# Patient Record
Sex: MALE | Race: WHITE | NOT HISPANIC OR LATINO
[De-identification: names, ages, dates, MRNs, and addresses within clinical notes are randomized per-mention and may not be internally consistent; named-entity substitution may affect disease eponyms.]

---

## 2017-01-04 ENCOUNTER — APPOINTMENT (OUTPATIENT)
Dept: NEPHROLOGY | Facility: CLINIC | Age: 67
End: 2017-01-04

## 2017-01-04 VITALS
HEART RATE: 80 BPM | OXYGEN SATURATION: 95 % | HEIGHT: 67 IN | SYSTOLIC BLOOD PRESSURE: 121 MMHG | DIASTOLIC BLOOD PRESSURE: 72 MMHG | BODY MASS INDEX: 49.44 KG/M2 | WEIGHT: 315 LBS

## 2017-01-05 LAB
ALBUMIN SERPL ELPH-MCNC: 3.6 G/DL
ANION GAP SERPL CALC-SCNC: 15 MMOL/L
APPEARANCE: ABNORMAL
BACTERIA: ABNORMAL
BASOPHILS # BLD AUTO: 0.06 K/UL
BASOPHILS NFR BLD AUTO: 0.7 %
BILIRUBIN URINE: NEGATIVE
BLOOD URINE: ABNORMAL
BUN SERPL-MCNC: 52 MG/DL
CALCIUM OXALATE CRYSTALS: NEGATIVE
CALCIUM SERPL-MCNC: 9.7 MG/DL
CHLORIDE SERPL-SCNC: 103 MMOL/L
CO2 SERPL-SCNC: 20 MMOL/L
COLOR: YELLOW
CREAT SERPL-MCNC: 2.49 MG/DL
CREAT SPEC-SCNC: 77 MG/DL
CREAT/PROT UR: 2.5 RATIO
EOSINOPHIL # BLD AUTO: 0.65 K/UL
EOSINOPHIL NFR BLD AUTO: 7.8 %
GLUCOSE QUALITATIVE U: NORMAL MG/DL
GLUCOSE SERPL-MCNC: 105 MG/DL
GRANULAR CASTS: 0 /LPF
HCT VFR BLD CALC: 45.4 %
HGB BLD-MCNC: 14.7 G/DL
HYALINE CASTS: 0 /LPF
IMM GRANULOCYTES NFR BLD AUTO: 0.2 %
KETONES URINE: NEGATIVE
LEUKOCYTE ESTERASE URINE: ABNORMAL
LYMPHOCYTES # BLD AUTO: 1.79 K/UL
LYMPHOCYTES NFR BLD AUTO: 21.4 %
MAN DIFF?: NORMAL
MCHC RBC-ENTMCNC: 30.4 PG
MCHC RBC-ENTMCNC: 32.4 GM/DL
MCV RBC AUTO: 93.8 FL
MICROSCOPIC-UA: NORMAL
MONOCYTES # BLD AUTO: 0.77 K/UL
MONOCYTES NFR BLD AUTO: 9.2 %
NEUTROPHILS # BLD AUTO: 5.08 K/UL
NEUTROPHILS NFR BLD AUTO: 60.7 %
NITRITE URINE: NEGATIVE
PH URINE: 6
PHOSPHATE SERPL-MCNC: 3.7 MG/DL
PLATELET # BLD AUTO: 223 K/UL
POTASSIUM SERPL-SCNC: 5.3 MMOL/L
PROT UR-MCNC: 194 MG/DL
PROTEIN URINE: 100 MG/DL
RBC # BLD: 4.84 M/UL
RBC # FLD: 14.3 %
RED BLOOD CELLS URINE: 2 /HPF
SODIUM SERPL-SCNC: 138 MMOL/L
SPECIFIC GRAVITY URINE: 1.02
SQUAMOUS EPITHELIAL CELLS: 0 /HPF
TRIPLE PHOSPHATE CRYSTALS: NEGATIVE
URIC ACID CRYSTALS: NEGATIVE
UROBILINOGEN URINE: NORMAL MG/DL
WBC # FLD AUTO: 8.37 K/UL
WHITE BLOOD CELLS URINE: 180 /HPF

## 2017-01-06 ENCOUNTER — LABORATORY RESULT (OUTPATIENT)
Age: 67
End: 2017-01-06

## 2017-01-18 ENCOUNTER — LABORATORY RESULT (OUTPATIENT)
Age: 67
End: 2017-01-18

## 2017-01-18 ENCOUNTER — APPOINTMENT (OUTPATIENT)
Dept: INTERNAL MEDICINE | Facility: CLINIC | Age: 67
End: 2017-01-18

## 2017-01-18 VITALS
SYSTOLIC BLOOD PRESSURE: 120 MMHG | DIASTOLIC BLOOD PRESSURE: 76 MMHG | HEIGHT: 67 IN | BODY MASS INDEX: 49.44 KG/M2 | WEIGHT: 315 LBS

## 2017-01-26 LAB
APPEARANCE: CLEAR
BILIRUBIN URINE: NEGATIVE
BLOOD URINE: ABNORMAL
COLOR: YELLOW
GLUCOSE QUALITATIVE U: NORMAL MG/DL
KETONES URINE: NEGATIVE
LEUKOCYTE ESTERASE URINE: ABNORMAL
NITRITE URINE: POSITIVE
PH URINE: 5.5
PROTEIN URINE: 300 MG/DL
SPECIFIC GRAVITY URINE: 1.02
UROBILINOGEN URINE: NORMAL MG/DL

## 2017-02-03 LAB
ALBUMIN SERPL ELPH-MCNC: 3.8 G/DL
ALP BLD-CCNC: 74 U/L
ALT SERPL-CCNC: 33 U/L
ANION GAP SERPL CALC-SCNC: 21 MMOL/L
AST SERPL-CCNC: 27 U/L
BASOPHILS # BLD AUTO: 0.05 K/UL
BASOPHILS NFR BLD AUTO: 0.6 %
BILIRUB SERPL-MCNC: 0.7 MG/DL
BUN SERPL-MCNC: 58 MG/DL
CALCIUM SERPL-MCNC: 10.2 MG/DL
CHLORIDE SERPL-SCNC: 107 MMOL/L
CHOLEST SERPL-MCNC: 127 MG/DL
CHOLEST/HDLC SERPL: 3.3 RATIO
CO2 SERPL-SCNC: 14 MMOL/L
CREAT SERPL-MCNC: 2.68 MG/DL
EOSINOPHIL # BLD AUTO: 0.67 K/UL
EOSINOPHIL NFR BLD AUTO: 7.5 %
GLUCOSE SERPL-MCNC: 91 MG/DL
HBA1C MFR BLD HPLC: 6.7 %
HCT VFR BLD CALC: 45.2 %
HDLC SERPL-MCNC: 38 MG/DL
HGB BLD-MCNC: 15.3 G/DL
IMM GRANULOCYTES NFR BLD AUTO: 0.1 %
LDLC SERPL CALC-MCNC: 58 MG/DL
LYMPHOCYTES # BLD AUTO: 1.81 K/UL
LYMPHOCYTES NFR BLD AUTO: 20.4 %
MAN DIFF?: NORMAL
MCHC RBC-ENTMCNC: 31.4 PG
MCHC RBC-ENTMCNC: 33.8 GM/DL
MCV RBC AUTO: 92.6 FL
MONOCYTES # BLD AUTO: 0.69 K/UL
MONOCYTES NFR BLD AUTO: 7.8 %
NEUTROPHILS # BLD AUTO: 5.66 K/UL
NEUTROPHILS NFR BLD AUTO: 63.6 %
PLATELET # BLD AUTO: 189 K/UL
POTASSIUM SERPL-SCNC: 5.2 MMOL/L
PROT SERPL-MCNC: 7.3 G/DL
RBC # BLD: 4.88 M/UL
RBC # FLD: 14.3 %
SODIUM SERPL-SCNC: 142 MMOL/L
TRIGL SERPL-MCNC: 154 MG/DL
TSH SERPL-ACNC: 2.05 UIU/ML
WBC # FLD AUTO: 8.89 K/UL

## 2017-02-10 ENCOUNTER — RX RENEWAL (OUTPATIENT)
Age: 67
End: 2017-02-10

## 2017-02-13 ENCOUNTER — RX RENEWAL (OUTPATIENT)
Age: 67
End: 2017-02-13

## 2017-02-22 ENCOUNTER — RX RENEWAL (OUTPATIENT)
Age: 67
End: 2017-02-22

## 2017-03-21 ENCOUNTER — MEDICATION RENEWAL (OUTPATIENT)
Age: 67
End: 2017-03-21

## 2017-03-31 ENCOUNTER — RX RENEWAL (OUTPATIENT)
Age: 67
End: 2017-03-31

## 2017-04-05 ENCOUNTER — APPOINTMENT (OUTPATIENT)
Dept: NEPHROLOGY | Facility: CLINIC | Age: 67
End: 2017-04-05

## 2017-04-05 VITALS
WEIGHT: 301 LBS | DIASTOLIC BLOOD PRESSURE: 72 MMHG | HEIGHT: 67 IN | BODY MASS INDEX: 47.24 KG/M2 | OXYGEN SATURATION: 95 % | HEART RATE: 74 BPM | SYSTOLIC BLOOD PRESSURE: 110 MMHG

## 2017-04-05 RX ORDER — ATENOLOL 25 MG/1
25 TABLET ORAL DAILY
Qty: 90 | Refills: 3 | Status: DISCONTINUED | COMMUNITY
Start: 2017-02-13 | End: 2017-04-05

## 2017-04-05 RX ORDER — LOSARTAN POTASSIUM 25 MG/1
25 TABLET, FILM COATED ORAL
Qty: 30 | Refills: 0 | Status: DISCONTINUED | COMMUNITY
Start: 2016-07-08

## 2017-04-05 RX ORDER — SULFAMETHOXAZOLE AND TRIMETHOPRIM 400; 80 MG/1; MG/1
400-80 TABLET ORAL TWICE DAILY
Qty: 10 | Refills: 0 | Status: DISCONTINUED | COMMUNITY
Start: 2017-01-26 | End: 2017-04-05

## 2017-04-06 LAB
25(OH)D3 SERPL-MCNC: 30.5 NG/ML
ALBUMIN SERPL ELPH-MCNC: 3.7 G/DL
ANION GAP SERPL CALC-SCNC: 13 MMOL/L
BASOPHILS # BLD AUTO: 0.05 K/UL
BASOPHILS NFR BLD AUTO: 0.6 %
BUN SERPL-MCNC: 50 MG/DL
CALCIUM SERPL-MCNC: 9.7 MG/DL
CALCIUM SERPL-MCNC: 9.7 MG/DL
CHLORIDE SERPL-SCNC: 104 MMOL/L
CO2 SERPL-SCNC: 21 MMOL/L
CREAT SERPL-MCNC: 2.59 MG/DL
CREAT SPEC-SCNC: 76 MG/DL
CREAT/PROT UR: 1.8 RATIO
EOSINOPHIL # BLD AUTO: 0.56 K/UL
EOSINOPHIL NFR BLD AUTO: 6.2 %
GLUCOSE SERPL-MCNC: 96 MG/DL
HBA1C MFR BLD HPLC: 5.6 %
HCT VFR BLD CALC: 42.1 %
HGB BLD-MCNC: 13.9 G/DL
IMM GRANULOCYTES NFR BLD AUTO: 0.3 %
LYMPHOCYTES # BLD AUTO: 1.39 K/UL
LYMPHOCYTES NFR BLD AUTO: 15.3 %
MAN DIFF?: NORMAL
MCHC RBC-ENTMCNC: 30.9 PG
MCHC RBC-ENTMCNC: 33 GM/DL
MCV RBC AUTO: 93.6 FL
MONOCYTES # BLD AUTO: 0.79 K/UL
MONOCYTES NFR BLD AUTO: 8.7 %
NEUTROPHILS # BLD AUTO: 6.24 K/UL
NEUTROPHILS NFR BLD AUTO: 68.9 %
PARATHYROID HORMONE INTACT: 26 PG/ML
PHOSPHATE SERPL-MCNC: 2.9 MG/DL
PLATELET # BLD AUTO: 213 K/UL
POTASSIUM SERPL-SCNC: 5 MMOL/L
PROT UR-MCNC: 134 MG/DL
RBC # BLD: 4.5 M/UL
RBC # FLD: 14.5 %
SODIUM SERPL-SCNC: 138 MMOL/L
WBC # FLD AUTO: 9.06 K/UL

## 2017-04-12 ENCOUNTER — APPOINTMENT (OUTPATIENT)
Dept: INTERNAL MEDICINE | Facility: CLINIC | Age: 67
End: 2017-04-12

## 2017-04-12 VITALS
OXYGEN SATURATION: 94 % | DIASTOLIC BLOOD PRESSURE: 60 MMHG | HEART RATE: 78 BPM | WEIGHT: 300 LBS | BODY MASS INDEX: 46.99 KG/M2 | SYSTOLIC BLOOD PRESSURE: 112 MMHG

## 2017-06-06 ENCOUNTER — MEDICATION RENEWAL (OUTPATIENT)
Age: 67
End: 2017-06-06

## 2017-07-05 ENCOUNTER — APPOINTMENT (OUTPATIENT)
Dept: NEPHROLOGY | Facility: CLINIC | Age: 67
End: 2017-07-05

## 2017-07-05 VITALS
OXYGEN SATURATION: 96 % | HEIGHT: 67 IN | HEART RATE: 81 BPM | WEIGHT: 292 LBS | SYSTOLIC BLOOD PRESSURE: 110 MMHG | DIASTOLIC BLOOD PRESSURE: 69 MMHG | BODY MASS INDEX: 45.83 KG/M2

## 2017-07-06 LAB
ALBUMIN SERPL ELPH-MCNC: 3.9 G/DL
ANION GAP SERPL CALC-SCNC: 16 MMOL/L
BASOPHILS # BLD AUTO: 0.03 K/UL
BASOPHILS NFR BLD AUTO: 0.3 %
BUN SERPL-MCNC: 48 MG/DL
CALCIUM SERPL-MCNC: 9.1 MG/DL
CHLORIDE SERPL-SCNC: 102 MMOL/L
CO2 SERPL-SCNC: 19 MMOL/L
CREAT SERPL-MCNC: 2.71 MG/DL
CREAT SPEC-SCNC: 93 MG/DL
CREAT/PROT UR: 1.7 RATIO
EOSINOPHIL # BLD AUTO: 0.73 K/UL
EOSINOPHIL NFR BLD AUTO: 8.2 %
GLUCOSE SERPL-MCNC: 112 MG/DL
HCT VFR BLD CALC: 42.7 %
HGB BLD-MCNC: 14.1 G/DL
IMM GRANULOCYTES NFR BLD AUTO: 0.2 %
LYMPHOCYTES # BLD AUTO: 1.53 K/UL
LYMPHOCYTES NFR BLD AUTO: 17.2 %
MAGNESIUM SERPL-MCNC: 1.6 MG/DL
MAN DIFF?: NORMAL
MCHC RBC-ENTMCNC: 30.9 PG
MCHC RBC-ENTMCNC: 33 GM/DL
MCV RBC AUTO: 93.6 FL
MONOCYTES # BLD AUTO: 1.04 K/UL
MONOCYTES NFR BLD AUTO: 11.7 %
NEUTROPHILS # BLD AUTO: 5.57 K/UL
NEUTROPHILS NFR BLD AUTO: 62.4 %
PHOSPHATE SERPL-MCNC: 3.7 MG/DL
PLATELET # BLD AUTO: 224 K/UL
POTASSIUM SERPL-SCNC: 4.7 MMOL/L
PROT UR-MCNC: 154 MG/DL
RBC # BLD: 4.56 M/UL
RBC # FLD: 14 %
SODIUM SERPL-SCNC: 137 MMOL/L
WBC # FLD AUTO: 8.92 K/UL

## 2017-07-07 LAB — HEMOCCULT STL QL IA: NEGATIVE

## 2017-07-12 ENCOUNTER — APPOINTMENT (OUTPATIENT)
Dept: INTERNAL MEDICINE | Facility: CLINIC | Age: 67
End: 2017-07-12

## 2017-07-12 VITALS
WEIGHT: 298 LBS | OXYGEN SATURATION: 95 % | DIASTOLIC BLOOD PRESSURE: 72 MMHG | SYSTOLIC BLOOD PRESSURE: 119 MMHG | HEART RATE: 85 BPM | BODY MASS INDEX: 46.67 KG/M2

## 2017-08-02 ENCOUNTER — RX RENEWAL (OUTPATIENT)
Age: 67
End: 2017-08-02

## 2017-08-04 ENCOUNTER — MEDICATION RENEWAL (OUTPATIENT)
Age: 67
End: 2017-08-04

## 2017-09-06 ENCOUNTER — APPOINTMENT (OUTPATIENT)
Dept: NEPHROLOGY | Facility: CLINIC | Age: 67
End: 2017-09-06
Payer: MEDICARE

## 2017-09-06 VITALS
BODY MASS INDEX: 46.77 KG/M2 | SYSTOLIC BLOOD PRESSURE: 126 MMHG | OXYGEN SATURATION: 95 % | HEART RATE: 77 BPM | WEIGHT: 298 LBS | DIASTOLIC BLOOD PRESSURE: 77 MMHG | HEIGHT: 67 IN

## 2017-09-06 PROCEDURE — 99214 OFFICE O/P EST MOD 30 MIN: CPT | Mod: 25,GC

## 2017-09-06 PROCEDURE — 90740 HEPB VACC 3 DOSE IMMUNSUP IM: CPT | Mod: GC

## 2017-09-06 PROCEDURE — G0010: CPT | Mod: GC

## 2017-09-07 LAB
ALBUMIN SERPL ELPH-MCNC: 4.1 G/DL
ANION GAP SERPL CALC-SCNC: 16 MMOL/L
BASOPHILS # BLD AUTO: 0.07 K/UL
BASOPHILS NFR BLD AUTO: 0.7 %
BUN SERPL-MCNC: 64 MG/DL
CALCIUM SERPL-MCNC: 9.7 MG/DL
CHLORIDE SERPL-SCNC: 106 MMOL/L
CO2 SERPL-SCNC: 20 MMOL/L
CREAT SERPL-MCNC: 2.65 MG/DL
CREAT SPEC-SCNC: 75 MG/DL
CREAT/PROT UR: 2.6 RATIO
EOSINOPHIL # BLD AUTO: 0.79 K/UL
EOSINOPHIL NFR BLD AUTO: 8.4 %
GLUCOSE SERPL-MCNC: 99 MG/DL
HCT VFR BLD CALC: 47.3 %
HGB BLD-MCNC: 15.2 G/DL
IMM GRANULOCYTES NFR BLD AUTO: 0.3 %
LYMPHOCYTES # BLD AUTO: 2.48 K/UL
LYMPHOCYTES NFR BLD AUTO: 26.4 %
MAN DIFF?: NORMAL
MCHC RBC-ENTMCNC: 30.5 PG
MCHC RBC-ENTMCNC: 32.1 GM/DL
MCV RBC AUTO: 94.8 FL
MONOCYTES # BLD AUTO: 0.87 K/UL
MONOCYTES NFR BLD AUTO: 9.3 %
NEUTROPHILS # BLD AUTO: 5.14 K/UL
NEUTROPHILS NFR BLD AUTO: 54.9 %
PHOSPHATE SERPL-MCNC: 3.7 MG/DL
PLATELET # BLD AUTO: 228 K/UL
POTASSIUM SERPL-SCNC: 5.5 MMOL/L
PROT UR-MCNC: 193 MG/DL
RBC # BLD: 4.99 M/UL
RBC # FLD: 14.9 %
SODIUM SERPL-SCNC: 142 MMOL/L
WBC # FLD AUTO: 9.38 K/UL

## 2017-09-25 ENCOUNTER — RX RENEWAL (OUTPATIENT)
Age: 67
End: 2017-09-25

## 2017-10-10 ENCOUNTER — APPOINTMENT (OUTPATIENT)
Dept: INTERNAL MEDICINE | Facility: CLINIC | Age: 67
End: 2017-10-10
Payer: MEDICARE

## 2017-10-10 VITALS
OXYGEN SATURATION: 96 % | WEIGHT: 312 LBS | HEART RATE: 65 BPM | SYSTOLIC BLOOD PRESSURE: 127 MMHG | BODY MASS INDEX: 48.87 KG/M2 | DIASTOLIC BLOOD PRESSURE: 79 MMHG

## 2017-10-10 PROCEDURE — 99214 OFFICE O/P EST MOD 30 MIN: CPT

## 2017-11-07 ENCOUNTER — RX RENEWAL (OUTPATIENT)
Age: 67
End: 2017-11-07

## 2017-11-20 ENCOUNTER — RX RENEWAL (OUTPATIENT)
Age: 67
End: 2017-11-20

## 2017-12-08 ENCOUNTER — MEDICATION RENEWAL (OUTPATIENT)
Age: 67
End: 2017-12-08

## 2017-12-11 ENCOUNTER — OUTPATIENT (OUTPATIENT)
Dept: OUTPATIENT SERVICES | Facility: HOSPITAL | Age: 67
LOS: 1 days | End: 2017-12-11
Payer: MEDICARE

## 2017-12-11 ENCOUNTER — APPOINTMENT (OUTPATIENT)
Dept: UROLOGY | Facility: CLINIC | Age: 67
End: 2017-12-11
Payer: MEDICARE

## 2017-12-11 DIAGNOSIS — R35.0 FREQUENCY OF MICTURITION: ICD-10-CM

## 2017-12-11 PROCEDURE — 76775 US EXAM ABDO BACK WALL LIM: CPT

## 2017-12-11 PROCEDURE — 76775 US EXAM ABDO BACK WALL LIM: CPT | Mod: 26

## 2017-12-11 PROCEDURE — 99214 OFFICE O/P EST MOD 30 MIN: CPT | Mod: 25

## 2017-12-26 ENCOUNTER — RX RENEWAL (OUTPATIENT)
Age: 67
End: 2017-12-26

## 2017-12-26 DIAGNOSIS — N20.0 CALCULUS OF KIDNEY: ICD-10-CM

## 2017-12-26 DIAGNOSIS — N18.4 CHRONIC KIDNEY DISEASE, STAGE 4 (SEVERE): ICD-10-CM

## 2018-01-02 ENCOUNTER — RX RENEWAL (OUTPATIENT)
Age: 68
End: 2018-01-02

## 2018-01-05 ENCOUNTER — MEDICATION RENEWAL (OUTPATIENT)
Age: 68
End: 2018-01-05

## 2018-01-08 ENCOUNTER — RX RENEWAL (OUTPATIENT)
Age: 68
End: 2018-01-08

## 2018-01-10 ENCOUNTER — APPOINTMENT (OUTPATIENT)
Dept: INTERNAL MEDICINE | Facility: CLINIC | Age: 68
End: 2018-01-10
Payer: MEDICARE

## 2018-01-10 VITALS
WEIGHT: 312 LBS | HEART RATE: 80 BPM | OXYGEN SATURATION: 96 % | DIASTOLIC BLOOD PRESSURE: 80 MMHG | BODY MASS INDEX: 48.97 KG/M2 | SYSTOLIC BLOOD PRESSURE: 130 MMHG | HEIGHT: 67 IN

## 2018-01-10 PROCEDURE — G0008: CPT

## 2018-01-10 PROCEDURE — 99214 OFFICE O/P EST MOD 30 MIN: CPT | Mod: 25

## 2018-01-10 PROCEDURE — 90688 IIV4 VACCINE SPLT 0.5 ML IM: CPT

## 2018-01-17 ENCOUNTER — APPOINTMENT (OUTPATIENT)
Dept: NEPHROLOGY | Facility: CLINIC | Age: 68
End: 2018-01-17
Payer: MEDICARE

## 2018-01-17 VITALS
OXYGEN SATURATION: 96 % | HEIGHT: 67 IN | HEART RATE: 83 BPM | DIASTOLIC BLOOD PRESSURE: 64 MMHG | SYSTOLIC BLOOD PRESSURE: 116 MMHG

## 2018-01-17 VITALS — SYSTOLIC BLOOD PRESSURE: 110 MMHG | DIASTOLIC BLOOD PRESSURE: 70 MMHG

## 2018-01-17 VITALS — SYSTOLIC BLOOD PRESSURE: 92 MMHG | DIASTOLIC BLOOD PRESSURE: 60 MMHG

## 2018-01-17 PROCEDURE — 99214 OFFICE O/P EST MOD 30 MIN: CPT | Mod: GC

## 2018-01-22 LAB
ALBUMIN SERPL ELPH-MCNC: 4 G/DL
ANION GAP SERPL CALC-SCNC: 17 MMOL/L
BASOPHILS # BLD AUTO: 0.08 K/UL
BASOPHILS NFR BLD AUTO: 0.8 %
BUN SERPL-MCNC: 59 MG/DL
CALCIUM SERPL-MCNC: 9.3 MG/DL
CHLORIDE SERPL-SCNC: 106 MMOL/L
CO2 SERPL-SCNC: 17 MMOL/L
CREAT SERPL-MCNC: 2.81 MG/DL
CREAT SPEC-SCNC: 97 MG/DL
CREAT/PROT UR: 2.3 RATIO
EOSINOPHIL # BLD AUTO: 0.76 K/UL
EOSINOPHIL NFR BLD AUTO: 7.7 %
GLUCOSE SERPL-MCNC: 138 MG/DL
HBA1C MFR BLD HPLC: 6.1 %
HCT VFR BLD CALC: 46.9 %
HGB BLD-MCNC: 15.2 G/DL
IMM GRANULOCYTES NFR BLD AUTO: 0.1 %
LYMPHOCYTES # BLD AUTO: 2.36 K/UL
LYMPHOCYTES NFR BLD AUTO: 24.1 %
MAN DIFF?: NORMAL
MCHC RBC-ENTMCNC: 30.5 PG
MCHC RBC-ENTMCNC: 32.4 GM/DL
MCV RBC AUTO: 94.2 FL
MONOCYTES # BLD AUTO: 1 K/UL
MONOCYTES NFR BLD AUTO: 10.2 %
NEUTROPHILS # BLD AUTO: 5.6 K/UL
NEUTROPHILS NFR BLD AUTO: 57.1 %
PHOSPHATE SERPL-MCNC: 3.7 MG/DL
PLATELET # BLD AUTO: 221 K/UL
POTASSIUM SERPL-SCNC: 5 MMOL/L
PROT UR-MCNC: 224 MG/DL
RBC # BLD: 4.98 M/UL
RBC # FLD: 15.2 %
SODIUM SERPL-SCNC: 140 MMOL/L
URATE SERPL-MCNC: 6.9 MG/DL
WBC # FLD AUTO: 9.81 K/UL

## 2018-02-05 LAB
ALBUMIN SERPL ELPH-MCNC: 3.7 G/DL
ANION GAP SERPL CALC-SCNC: 15 MMOL/L
BUN SERPL-MCNC: 61 MG/DL
CALCIUM SERPL-MCNC: 9 MG/DL
CHLORIDE SERPL-SCNC: 105 MMOL/L
CO2 SERPL-SCNC: 19 MMOL/L
CREAT SERPL-MCNC: 3.06 MG/DL
GLUCOSE SERPL-MCNC: 126 MG/DL
PHOSPHATE SERPL-MCNC: 4.5 MG/DL
POTASSIUM SERPL-SCNC: 5.1 MMOL/L
SODIUM SERPL-SCNC: 139 MMOL/L

## 2018-03-23 ENCOUNTER — MEDICATION RENEWAL (OUTPATIENT)
Age: 68
End: 2018-03-23

## 2018-05-09 ENCOUNTER — APPOINTMENT (OUTPATIENT)
Dept: UROLOGY | Facility: CLINIC | Age: 68
End: 2018-05-09
Payer: MEDICARE

## 2018-05-09 VITALS
TEMPERATURE: 97.9 F | SYSTOLIC BLOOD PRESSURE: 102 MMHG | HEART RATE: 76 BPM | RESPIRATION RATE: 17 BRPM | BODY MASS INDEX: 48.97 KG/M2 | HEIGHT: 67 IN | WEIGHT: 312 LBS | DIASTOLIC BLOOD PRESSURE: 66 MMHG

## 2018-05-09 PROCEDURE — 99213 OFFICE O/P EST LOW 20 MIN: CPT

## 2018-05-23 ENCOUNTER — APPOINTMENT (OUTPATIENT)
Dept: NEPHROLOGY | Facility: CLINIC | Age: 68
End: 2018-05-23
Payer: MEDICARE

## 2018-05-23 VITALS
OXYGEN SATURATION: 96 % | DIASTOLIC BLOOD PRESSURE: 79 MMHG | RESPIRATION RATE: 16 BRPM | BODY MASS INDEX: 48.4 KG/M2 | SYSTOLIC BLOOD PRESSURE: 131 MMHG | WEIGHT: 309 LBS | HEART RATE: 75 BPM

## 2018-05-23 PROCEDURE — 99214 OFFICE O/P EST MOD 30 MIN: CPT

## 2018-05-24 DIAGNOSIS — E55.9 VITAMIN D DEFICIENCY, UNSPECIFIED: ICD-10-CM

## 2018-05-24 LAB
25(OH)D3 SERPL-MCNC: 32 NG/ML
ALBUMIN SERPL ELPH-MCNC: 3.9 G/DL
ANION GAP SERPL CALC-SCNC: 22 MMOL/L
BUN SERPL-MCNC: 56 MG/DL
CALCIUM SERPL-MCNC: 9.2 MG/DL
CALCIUM SERPL-MCNC: 9.2 MG/DL
CHLORIDE SERPL-SCNC: 108 MMOL/L
CO2 SERPL-SCNC: 13 MMOL/L
CREAT SERPL-MCNC: 2.61 MG/DL
CREAT SPEC-SCNC: 81 MG/DL
CREAT/PROT UR: 2.3 RATIO
FERRITIN SERPL-MCNC: 209 NG/ML
GLUCOSE SERPL-MCNC: 121 MG/DL
HBV SURFACE AB SER QL: NONREACTIVE
HBV SURFACE AG SER QL: NONREACTIVE
HCV AB SER QL: NONREACTIVE
HCV S/CO RATIO: 0.13 S/CO
IRON SATN MFR SERPL: 26 %
IRON SERPL-MCNC: 63 UG/DL
PARATHYROID HORMONE INTACT: 75 PG/ML
PHOSPHATE SERPL-MCNC: 3.4 MG/DL
POTASSIUM SERPL-SCNC: 5.8 MMOL/L
PROT UR-MCNC: 185 MG/DL
SODIUM SERPL-SCNC: 143 MMOL/L
TIBC SERPL-MCNC: 240 UG/DL
UIBC SERPL-MCNC: 177 UG/DL

## 2018-05-29 ENCOUNTER — APPOINTMENT (OUTPATIENT)
Dept: INTERNAL MEDICINE | Facility: CLINIC | Age: 68
End: 2018-05-29
Payer: MEDICARE

## 2018-05-29 VITALS
OXYGEN SATURATION: 96 % | WEIGHT: 307 LBS | SYSTOLIC BLOOD PRESSURE: 124 MMHG | DIASTOLIC BLOOD PRESSURE: 72 MMHG | HEART RATE: 83 BPM | HEIGHT: 67 IN | BODY MASS INDEX: 48.18 KG/M2

## 2018-05-29 PROCEDURE — 99214 OFFICE O/P EST MOD 30 MIN: CPT

## 2018-05-30 NOTE — PLAN
[FreeTextEntry1] : Pt to restart logging of glucoses; has new interest in working on dm control with diet again.  exercise must come back into picture as well to the degree it is possible for him.  can do walking, exercise bike.  must lose weight so that thr becomes viable for him; then greater walking/exercise program/wt loss is within reach.  he is following up regularly with Dr. Velasco.  He plans to recheck bloods next week or week after, as just yesterday started kayexelate in place of veltassa.

## 2018-05-30 NOTE — PHYSICAL EXAM

## 2018-05-30 NOTE — HISTORY OF PRESENT ILLNESS
[FreeTextEntry1] : follow-up [de-identified] : Pt just started new medication for hyperkalemia secondary to ckd yesterday - is not ready to check bloods again today.  but he has picked it up and gotten it initiated at least.  cost had been an issue for veltassa.  he continues to feel paresthesias at le's which are uncomfortable.  gabapentin does help - higher dosing may be an issue because of renal fxn.  he has become motivated again to lose weight and has curtailed po intake overall quite significantly over past couple of weeks.  he is losing weight again.  goal is to be eligible for hip surgery as his hip is constantly painful.  he has not restarted any semblance of exercise however - though has exercise bike at home.

## 2018-08-10 ENCOUNTER — MEDICATION RENEWAL (OUTPATIENT)
Age: 68
End: 2018-08-10

## 2018-08-29 ENCOUNTER — APPOINTMENT (OUTPATIENT)
Dept: NEPHROLOGY | Facility: CLINIC | Age: 68
End: 2018-08-29
Payer: MEDICARE

## 2018-08-29 VITALS
WEIGHT: 302 LBS | HEIGHT: 67 IN | SYSTOLIC BLOOD PRESSURE: 130 MMHG | DIASTOLIC BLOOD PRESSURE: 85 MMHG | OXYGEN SATURATION: 96 % | HEART RATE: 85 BPM | BODY MASS INDEX: 47.4 KG/M2

## 2018-08-29 DIAGNOSIS — R80.9 PROTEINURIA, UNSPECIFIED: ICD-10-CM

## 2018-08-29 DIAGNOSIS — N20.0 CALCULUS OF KIDNEY: ICD-10-CM

## 2018-08-29 PROCEDURE — 90740 HEPB VACC 3 DOSE IMMUNSUP IM: CPT | Mod: GC

## 2018-08-29 PROCEDURE — 99214 OFFICE O/P EST MOD 30 MIN: CPT | Mod: 25,GC

## 2018-08-29 PROCEDURE — G0010: CPT | Mod: GC

## 2018-08-31 LAB
25(OH)D3 SERPL-MCNC: 72.9 NG/ML
ALBUMIN SERPL ELPH-MCNC: 3.9 G/DL
ANION GAP SERPL CALC-SCNC: 17 MMOL/L
APPEARANCE: CLEAR
BACTERIA: NEGATIVE
BILIRUBIN URINE: NEGATIVE
BLOOD URINE: ABNORMAL
BUN SERPL-MCNC: 47 MG/DL
CALCIUM SERPL-MCNC: 9.8 MG/DL
CALCIUM SERPL-MCNC: 9.8 MG/DL
CHLORIDE SERPL-SCNC: 105 MMOL/L
CO2 SERPL-SCNC: 21 MMOL/L
COLOR: YELLOW
CREAT SERPL-MCNC: 2.23 MG/DL
CREAT SPEC-SCNC: 76 MG/DL
CREAT/PROT UR: 4.2 RATIO
GLUCOSE QUALITATIVE U: NEGATIVE MG/DL
GLUCOSE SERPL-MCNC: 173 MG/DL
HYALINE CASTS: 1 /LPF
KETONES URINE: NEGATIVE
LEUKOCYTE ESTERASE URINE: ABNORMAL
MICROSCOPIC-UA: NORMAL
NITRITE URINE: NEGATIVE
PARATHYROID HORMONE INTACT: 72 PG/ML
PH URINE: 6
PHOSPHATE SERPL-MCNC: 4 MG/DL
POTASSIUM SERPL-SCNC: 5.3 MMOL/L
PROT UR-MCNC: 322 MG/DL
PROTEIN URINE: 300 MG/DL
RED BLOOD CELLS URINE: 4 /HPF
SODIUM SERPL-SCNC: 143 MMOL/L
SPECIFIC GRAVITY URINE: 1.02
SQUAMOUS EPITHELIAL CELLS: 0 /HPF
UROBILINOGEN URINE: NEGATIVE MG/DL
WHITE BLOOD CELLS URINE: 58 /HPF

## 2018-09-16 ENCOUNTER — TRANSCRIPTION ENCOUNTER (OUTPATIENT)
Age: 68
End: 2018-09-16

## 2018-10-03 ENCOUNTER — APPOINTMENT (OUTPATIENT)
Dept: NEPHROLOGY | Facility: CLINIC | Age: 68
End: 2018-10-03

## 2018-10-03 ENCOUNTER — APPOINTMENT (OUTPATIENT)
Dept: INTERNAL MEDICINE | Facility: CLINIC | Age: 68
End: 2018-10-03
Payer: MEDICARE

## 2018-10-03 VITALS
BODY MASS INDEX: 47.09 KG/M2 | HEART RATE: 88 BPM | OXYGEN SATURATION: 95 % | SYSTOLIC BLOOD PRESSURE: 140 MMHG | WEIGHT: 300 LBS | DIASTOLIC BLOOD PRESSURE: 80 MMHG | HEIGHT: 67 IN

## 2018-10-03 VITALS
SYSTOLIC BLOOD PRESSURE: 142 MMHG | OXYGEN SATURATION: 96 % | HEART RATE: 82 BPM | DIASTOLIC BLOOD PRESSURE: 78 MMHG | HEIGHT: 67 IN

## 2018-10-03 PROCEDURE — 90662 IIV NO PRSV INCREASED AG IM: CPT

## 2018-10-03 PROCEDURE — 99214 OFFICE O/P EST MOD 30 MIN: CPT | Mod: 25

## 2018-10-03 PROCEDURE — G0008: CPT

## 2018-10-13 NOTE — PLAN
[FreeTextEntry1] : Pt is doing well with diet such that has gotten weight to verge of breaking 300#, which has been a real barrier for him.  FS have come into line very well with this approach - diet has been generally quite low carb.  I have asked himi to make appt with orthopedics to discuss options regarding hip which has been constantly painful and limiting.  he wants however to lose more weight first before consulting with ortho.  he has required t+c in order to remain somewhat comfortable and walking as needs to, has tolerated without side effects.  will continue for now - definitive tx of hip remains the goal.  following up regularly and well with Dr. Velasco for ckd.  hep b#2 today.

## 2018-10-13 NOTE — HISTORY OF PRESENT ILLNESS
[FreeTextEntry1] : follow-up [de-identified] : Pt with recent ear infection - went to urgent care.  had used drops in r ear, then in l ear when that ear began to be uncomfortable.  has stopped being as aggressive with q-tips since that time.  Meanwhile, pt has been keeping up dietary efforts and has brought weight to point he is getting under 300#.  His aspiration is to keep driving weight below 300# and then see orthopedics to discuss possible operation for hip.  he is making his meals more mixed - protein/fat/limited carbs.  FS in AM  have been 80-90.  is one shot into hep B series - was found to be non-immune by nephrology - and would be due for vax #2

## 2018-10-13 NOTE — PHYSICAL EXAM

## 2018-10-23 ENCOUNTER — RX RENEWAL (OUTPATIENT)
Age: 68
End: 2018-10-23

## 2018-11-09 ENCOUNTER — RX RENEWAL (OUTPATIENT)
Age: 68
End: 2018-11-09

## 2018-11-14 ENCOUNTER — RX RENEWAL (OUTPATIENT)
Age: 68
End: 2018-11-14

## 2018-11-15 ENCOUNTER — RX CHANGE (OUTPATIENT)
Age: 68
End: 2018-11-15

## 2018-11-19 RX ORDER — SODIUM POLYSTYRENE SULFONATE 15 G/60ML
15 SUSPENSION ORAL; RECTAL DAILY
Qty: 900 | Refills: 11 | Status: DISCONTINUED | COMMUNITY
Start: 2018-05-22 | End: 2018-11-19

## 2018-11-19 RX ORDER — SODIUM POLYSTYRENE SULFONATE 4.1 MEQ/G
POWDER, FOR SUSPENSION ORAL; RECTAL
Qty: 30 | Refills: 2 | Status: DISCONTINUED | COMMUNITY
Start: 2018-11-15 | End: 2018-11-19

## 2018-11-29 ENCOUNTER — RX RENEWAL (OUTPATIENT)
Age: 68
End: 2018-11-29

## 2018-12-06 ENCOUNTER — MEDICATION RENEWAL (OUTPATIENT)
Age: 68
End: 2018-12-06

## 2018-12-16 ENCOUNTER — RX RENEWAL (OUTPATIENT)
Age: 68
End: 2018-12-16

## 2019-01-07 ENCOUNTER — MEDICATION RENEWAL (OUTPATIENT)
Age: 69
End: 2019-01-07

## 2019-01-09 ENCOUNTER — APPOINTMENT (OUTPATIENT)
Dept: INTERNAL MEDICINE | Facility: CLINIC | Age: 69
End: 2019-01-09
Payer: MEDICARE

## 2019-01-09 VITALS
OXYGEN SATURATION: 94 % | SYSTOLIC BLOOD PRESSURE: 150 MMHG | HEIGHT: 67 IN | WEIGHT: 308 LBS | BODY MASS INDEX: 48.34 KG/M2 | HEART RATE: 110 BPM | DIASTOLIC BLOOD PRESSURE: 90 MMHG

## 2019-01-09 PROCEDURE — 99214 OFFICE O/P EST MOD 30 MIN: CPT

## 2019-01-10 ENCOUNTER — RX RENEWAL (OUTPATIENT)
Age: 69
End: 2019-01-10

## 2019-01-22 NOTE — PLAN
[FreeTextEntry1] : pt working again on weight loss.  exercise limited but has been successful in sharply limiting carbohydrate intake to get weight down. working on this approach again fairly assiduously this past week and will need to continue to do so into next mos. will continue to follow up regullarly with Dr. Velasco and prefers to check his blood tests there.  bp more borderline than usual today but will expect this to decrease with weight as well.  will need cardiology evaluation prior to any procedure, and given stable but atypical ekg's in past would like to start evaluation process soon. he has historically strongly resisted this, but seems somewhat open to it today.  have referred pt to Dr. Oneill.  3 month follow-up in office.

## 2019-01-22 NOTE — PHYSICAL EXAM

## 2019-01-22 NOTE — HISTORY OF PRESENT ILLNESS
[FreeTextEntry1] : follow-up [de-identified] : Pt had stretch during holidays in which had fallen off best diet but is now back to dieting successfully.  reports fs have been wnl fasting at any rate - fs .  has been using 'blue emu' at feet and this is helpful to limit his peripheral neuropathy.  has been finding gabapentin effective as well.  we discussed keeping active, optimizing glucoses, and losing weight as means of moving towards viability/greater safety in undergoing hip replacement surgery which is needed to keep his function up.

## 2019-02-04 ENCOUNTER — RX RENEWAL (OUTPATIENT)
Age: 69
End: 2019-02-04

## 2019-02-06 ENCOUNTER — APPOINTMENT (OUTPATIENT)
Dept: NEPHROLOGY | Facility: CLINIC | Age: 69
End: 2019-02-06
Payer: MEDICARE

## 2019-02-06 VITALS
SYSTOLIC BLOOD PRESSURE: 143 MMHG | HEART RATE: 90 BPM | WEIGHT: 310 LBS | BODY MASS INDEX: 48.65 KG/M2 | DIASTOLIC BLOOD PRESSURE: 75 MMHG | HEIGHT: 67 IN | OXYGEN SATURATION: 95 %

## 2019-02-06 PROCEDURE — 99214 OFFICE O/P EST MOD 30 MIN: CPT | Mod: GC

## 2019-02-07 NOTE — REVIEW OF SYSTEMS
[Difficulty Walking] : difficulty walking [Fever] : no fever [Chills] : no chills [Feeling Poorly] : not feeling poorly [Feeling Tired] : not feeling tired [Recent Weight Gain (___ Lbs)] : no recent weight gain [Recent Weight Loss (___ Lbs)] : no recent weight loss [Eyesight Problems] : no eyesight problems [Loss Of Hearing] : no hearing loss [Sore Throat] : no sore throat [Chest Pain] : no chest pain [Palpitations] : no palpitations [Leg Claudication] : no intermittent leg claudication [Lower Ext Edema] : no extremity edema [Shortness Of Breath] : no shortness of breath [Cough] : no cough [SOB on Exertion] : no shortness of breath during exertion [Orthopnea] : no orthopnea [Abdominal Pain] : no abdominal pain [Vomiting] : no vomiting [Constipation] : constipation [Diarrhea] : no diarrhea [Dysuria] : no dysuria [Hesitancy] : no urinary hesitancy [Joint Swelling] : no joint swelling [Limb Swelling] : no limb swelling [Dizziness] : no dizziness [Fainting] : no fainting [Limb Weakness] : no limb weakness [Negative] : Heme/Lymph [FreeTextEntry9] : left Hip pain.  [de-identified] : Due to hip pain.

## 2019-02-07 NOTE — ASSESSMENT
[FreeTextEntry1] : 68 y.o M with PMHx mentioned above, following for CKD stage 4 with proteinuria,  secondary to HTN and DM, stable.\par \par CKD stage 4. \par -Stable, could not obtain labs today as patient is difficult stick, discussed with patient and will come back at later date for lab draw\par -Has proteinuria, on losartan. \par -On Bicarbonate tablets due to metabolic acidosis\par \par Hyperkalemia:\par - On Kayexalate, having some trouble with bowel movements on medication, relieved by prune juice\par - Continue to monitor K, awaiting lab draw\par \par Hypertension:\par -well controlled, continue current regimen. \par \par Mineral Bone disorder. \par -Awaiting patient return for lab draw\par \par Hep B. \par - Received vaccination during last visit \par \par RTC in 3-4 months.

## 2019-02-07 NOTE — PHYSICAL EXAM
[General Appearance - Alert] : alert [General Appearance - In No Acute Distress] : in no acute distress [Sclera] : the sclera and conjunctiva were normal [Oropharynx] : the oropharynx was normal [Jugular Venous Distention Increased] : there was no jugular-venous distention [Thyroid Diffuse Enlargement] : the thyroid was not enlarged [Auscultation Breath Sounds / Voice Sounds] : lungs were clear to auscultation bilaterally [Heart Rate And Rhythm] : heart rate was normal and rhythm regular [Heart Sounds] : normal S1 and S2 [Heart Sounds Gallop] : no gallops [Murmurs] : no murmurs [Heart Sounds Pericardial Friction Rub] : no pericardial rub [Edema] : there was no peripheral edema [Bowel Sounds] : normal bowel sounds [Abdomen Soft] : soft [Abdomen Tenderness] : non-tender [Abdomen Mass (___ Cm)] : no abdominal mass palpated [Abdomen Hernia] : no hernia was discovered [] : no rash [No Focal Deficits] : no focal deficits [Oriented To Time, Place, And Person] : oriented to person, place, and time [FreeTextEntry1] : Varicose veins noted. Left Hip pain.

## 2019-02-12 ENCOUNTER — RX RENEWAL (OUTPATIENT)
Age: 69
End: 2019-02-12

## 2019-02-14 ENCOUNTER — RESULT REVIEW (OUTPATIENT)
Age: 69
End: 2019-02-14

## 2019-02-14 LAB
ALBUMIN SERPL ELPH-MCNC: 3.4 G/DL
ALBUMIN SERPL ELPH-MCNC: 3.4 G/DL
ALP BLD-CCNC: 75 U/L
ALT SERPL-CCNC: 18 U/L
ANION GAP SERPL CALC-SCNC: 14 MMOL/L
AST SERPL-CCNC: 22 U/L
BASOPHILS # BLD AUTO: 0.05 K/UL
BASOPHILS NFR BLD AUTO: 0.8 %
BILIRUB DIRECT SERPL-MCNC: 0.2 MG/DL
BILIRUB INDIRECT SERPL-MCNC: 0.4 MG/DL
BILIRUB SERPL-MCNC: 0.6 MG/DL
BUN SERPL-MCNC: 24 MG/DL
CALCIUM SERPL-MCNC: 9 MG/DL
CHLORIDE SERPL-SCNC: 102 MMOL/L
CO2 SERPL-SCNC: 29 MMOL/L
CREAT SERPL-MCNC: 2.02 MG/DL
CREAT SPEC-SCNC: 97 MG/DL
CREAT/PROT UR: 8.9 RATIO
EOSINOPHIL # BLD AUTO: 0.49 K/UL
EOSINOPHIL NFR BLD AUTO: 8.2 %
GLUCOSE SERPL-MCNC: 137 MG/DL
HBA1C MFR BLD HPLC: 7 %
HCT VFR BLD CALC: 49.4 %
HGB BLD-MCNC: 16 G/DL
IMM GRANULOCYTES NFR BLD AUTO: 0.3 %
LYMPHOCYTES # BLD AUTO: 1.69 K/UL
LYMPHOCYTES NFR BLD AUTO: 28.3 %
MAGNESIUM SERPL-MCNC: 1.5 MG/DL
MAN DIFF?: NORMAL
MCHC RBC-ENTMCNC: 30.3 PG
MCHC RBC-ENTMCNC: 32.4 GM/DL
MCV RBC AUTO: 93.6 FL
MONOCYTES # BLD AUTO: 0.5 K/UL
MONOCYTES NFR BLD AUTO: 8.4 %
NEUTROPHILS # BLD AUTO: 3.22 K/UL
NEUTROPHILS NFR BLD AUTO: 54 %
PHOSPHATE SERPL-MCNC: 2.9 MG/DL
PLATELET # BLD AUTO: 191 K/UL
POTASSIUM SERPL-SCNC: 4 MMOL/L
PROT SERPL-MCNC: 6.4 G/DL
PROT UR-MCNC: 866 MG/DL
RBC # BLD: 5.28 M/UL
RBC # FLD: 14.6 %
SODIUM SERPL-SCNC: 145 MMOL/L
WBC # FLD AUTO: 5.97 K/UL

## 2019-02-19 LAB
PHOSPHOLIPASE A2 RECEPTOR ELISA: 2 RU/ML
PHOSPHOLIPASE A2 RECEPTOR IFA: NEGATIVE

## 2019-03-05 ENCOUNTER — RX RENEWAL (OUTPATIENT)
Age: 69
End: 2019-03-05

## 2019-03-12 ENCOUNTER — RX RENEWAL (OUTPATIENT)
Age: 69
End: 2019-03-12

## 2019-03-13 ENCOUNTER — RX RENEWAL (OUTPATIENT)
Age: 69
End: 2019-03-13

## 2019-03-14 ENCOUNTER — MEDICATION RENEWAL (OUTPATIENT)
Age: 69
End: 2019-03-14

## 2019-03-14 ENCOUNTER — RX RENEWAL (OUTPATIENT)
Age: 69
End: 2019-03-14

## 2019-03-14 RX ORDER — SODIUM POLYSTYRENE SULFONATE 15 G/60ML
15 SUSPENSION ORAL; RECTAL DAILY
Qty: 1800 | Refills: 11 | Status: DISCONTINUED | COMMUNITY
Start: 2019-03-13 | End: 2019-03-14

## 2019-03-15 ENCOUNTER — RX RENEWAL (OUTPATIENT)
Age: 69
End: 2019-03-15

## 2019-04-09 ENCOUNTER — RESULT REVIEW (OUTPATIENT)
Age: 69
End: 2019-04-09

## 2019-04-09 LAB
ALBUMIN SERPL ELPH-MCNC: 3.4 G/DL
ANION GAP SERPL CALC-SCNC: 12 MMOL/L
BUN SERPL-MCNC: 31 MG/DL
CALCIUM SERPL-MCNC: 9 MG/DL
CHLORIDE SERPL-SCNC: 103 MMOL/L
CO2 SERPL-SCNC: 29 MMOL/L
CREAT SERPL-MCNC: 2.44 MG/DL
CREAT SPEC-SCNC: 113 MG/DL
CREAT/PROT UR: 9.6 RATIO
GLUCOSE SERPL-MCNC: 160 MG/DL
MAGNESIUM SERPL-MCNC: 1.5 MG/DL
PHOSPHATE SERPL-MCNC: 3.1 MG/DL
POTASSIUM SERPL-SCNC: 4.1 MMOL/L
PROT UR-MCNC: 1083 MG/DL
SODIUM SERPL-SCNC: 144 MMOL/L

## 2019-04-10 ENCOUNTER — APPOINTMENT (OUTPATIENT)
Dept: INTERNAL MEDICINE | Facility: CLINIC | Age: 69
End: 2019-04-10
Payer: MEDICARE

## 2019-04-10 VITALS
DIASTOLIC BLOOD PRESSURE: 80 MMHG | OXYGEN SATURATION: 95 % | WEIGHT: 313 LBS | HEIGHT: 67 IN | SYSTOLIC BLOOD PRESSURE: 140 MMHG | HEART RATE: 100 BPM | BODY MASS INDEX: 49.12 KG/M2

## 2019-04-10 DIAGNOSIS — R05 COUGH: ICD-10-CM

## 2019-04-10 DIAGNOSIS — R09.81 NASAL CONGESTION: ICD-10-CM

## 2019-04-10 PROCEDURE — 99214 OFFICE O/P EST MOD 30 MIN: CPT

## 2019-04-17 NOTE — HISTORY OF PRESENT ILLNESS
[de-identified] : Pt has had some recent sinus drip with hoarse voice resulting, occasional cough, but seems now to be letting up a bit.  This has been going on a few weeks in total.  He has been going through some dental work, with some extractions.  Had been losing weight but stalled out a bit.  Hip is still very painful and limiting mobility. Needs new glucometer - has not been obtaining reliable nor regular readings from his previous. Understands the gravity of getting these issues right.  Is somewhat willing to see an orthopedist at this point about his hip. [FreeTextEntry1] : follow up

## 2019-04-17 NOTE — PLAN
[FreeTextEntry1] : Pt with recent uri causing some ongoing sinus congestion and postnasal drip but slowly resolving.  Can use Fexofenadine for several days to resolve sx.  Must get back to checking FS regularly.  Must get back to some degree of exercise and to best diet to bring weight better into line.  Have referred to orthopedics for evaluation - it is time for him to address disabling hip pain and likely need for THR.  Will also be evaluated by Cardiology given vascular risks and difficulty ascertaining true cardiovascular functional status amidst disabling hip issue, with an eye towards preoperative assessment.  May utilize t+c prn for now.

## 2019-04-17 NOTE — PHYSICAL EXAM
[No Acute Distress] : no acute distress [Well Nourished] : well nourished [Well Developed] : well developed [Well-Appearing] : well-appearing [Normal Sclera/Conjunctiva] : normal sclera/conjunctiva [PERRL] : pupils equal round and reactive to light [EOMI] : extraocular movements intact [Normal Outer Ear/Nose] : the outer ears and nose were normal in appearance [No JVD] : no jugular venous distention [Supple] : supple [No Lymphadenopathy] : no lymphadenopathy [Thyroid Normal, No Nodules] : the thyroid was normal and there were no nodules present [No Respiratory Distress] : no respiratory distress  [Clear to Auscultation] : lungs were clear to auscultation bilaterally [No Accessory Muscle Use] : no accessory muscle use [Normal Rate] : normal rate  [Regular Rhythm] : with a regular rhythm [Normal S1, S2] : normal S1 and S2 [No Murmur] : no murmur heard [No Varicosities] : no varicosities [No Abdominal Bruit] : a ~M bruit was not heard ~T in the abdomen [No Carotid Bruits] : no carotid bruits [Pedal Pulses Present] : the pedal pulses are present [No Edema] : there was no peripheral edema [No Palpable Aorta] : no palpable aorta [Soft] : abdomen soft [No Extremity Clubbing/Cyanosis] : no extremity clubbing/cyanosis [Non-distended] : non-distended [Non Tender] : non-tender [No Masses] : no abdominal mass palpated [No HSM] : no HSM [Normal Bowel Sounds] : normal bowel sounds [Normal Anterior Cervical Nodes] : no anterior cervical lymphadenopathy [Normal Posterior Cervical Nodes] : no posterior cervical lymphadenopathy [No CVA Tenderness] : no CVA  tenderness [No Joint Swelling] : no joint swelling [No Spinal Tenderness] : no spinal tenderness [No Rash] : no rash [Normal Gait] : normal gait [No Focal Deficits] : no focal deficits [Coordination Grossly Intact] : coordination grossly intact [Normal Affect] : the affect was normal [Deep Tendon Reflexes (DTR)] : deep tendon reflexes were 2+ and symmetric [Normal Insight/Judgement] : insight and judgment were intact [de-identified] : swollen nasal turbinates, some injection of oropharynx [de-identified] : gait impaired by r hip pain; antalgic

## 2019-05-06 ENCOUNTER — APPOINTMENT (OUTPATIENT)
Dept: UROLOGY | Facility: CLINIC | Age: 69
End: 2019-05-06

## 2019-05-08 ENCOUNTER — APPOINTMENT (OUTPATIENT)
Dept: UROLOGY | Facility: CLINIC | Age: 69
End: 2019-05-08
Payer: MEDICARE

## 2019-05-08 ENCOUNTER — OUTPATIENT (OUTPATIENT)
Dept: OUTPATIENT SERVICES | Facility: HOSPITAL | Age: 69
LOS: 1 days | End: 2019-05-08
Payer: MEDICARE

## 2019-05-08 DIAGNOSIS — Z12.5 ENCOUNTER FOR SCREENING FOR MALIGNANT NEOPLASM OF PROSTATE: ICD-10-CM

## 2019-05-08 DIAGNOSIS — R35.0 FREQUENCY OF MICTURITION: ICD-10-CM

## 2019-05-08 DIAGNOSIS — N18.4 CHRONIC KIDNEY DISEASE, STAGE 4 (SEVERE): ICD-10-CM

## 2019-05-08 DIAGNOSIS — N20.0 CALCULUS OF KIDNEY: ICD-10-CM

## 2019-05-08 LAB — PSA SERPL-MCNC: 0.17 NG/ML

## 2019-05-08 PROCEDURE — 76775 US EXAM ABDO BACK WALL LIM: CPT

## 2019-05-08 PROCEDURE — 76775 US EXAM ABDO BACK WALL LIM: CPT | Mod: 26

## 2019-05-08 PROCEDURE — 99213 OFFICE O/P EST LOW 20 MIN: CPT | Mod: 25

## 2019-05-14 ENCOUNTER — RX RENEWAL (OUTPATIENT)
Age: 69
End: 2019-05-14

## 2019-06-06 NOTE — HISTORY OF PRESENT ILLNESS
[None] : None [FreeTextEntry1] : 69 y/o man hx of kidney stones, CKD\par Here for f/u\par \par Renal sono: no obvious stones\par \par 24 hr urine: good vol, high sodium, citrate 211, pH 6.58\par \par Cr 2.4

## 2019-06-06 NOTE — ASSESSMENT
[FreeTextEntry1] : Maintain increased fluids intake, low salt diet.\par Renal sono before next visit.\par f/u 6 months.\par \par PSA today.

## 2019-06-06 NOTE — PHYSICAL EXAM
[General Appearance - Well Developed] : well developed [Abdomen Soft] : soft [Abdomen Tenderness] : non-tender [Costovertebral Angle Tenderness] : no ~M costovertebral angle tenderness [Skin Color & Pigmentation] : normal skin color and pigmentation [Edema] : no peripheral edema [] : no respiratory distress [Oriented To Time, Place, And Person] : oriented to person, place, and time [Affect] : the affect was normal [Mood] : the mood was normal [Not Anxious] : not anxious [Normal Station and Gait] : the gait and station were normal for the patient's age [FreeTextEntry1] : obese

## 2019-06-17 ENCOUNTER — RX RENEWAL (OUTPATIENT)
Age: 69
End: 2019-06-17

## 2019-07-03 ENCOUNTER — APPOINTMENT (OUTPATIENT)
Dept: NEPHROLOGY | Facility: CLINIC | Age: 69
End: 2019-07-03
Payer: MEDICARE

## 2019-07-03 VITALS
OXYGEN SATURATION: 95 % | BODY MASS INDEX: 48.65 KG/M2 | HEART RATE: 87 BPM | HEIGHT: 67 IN | SYSTOLIC BLOOD PRESSURE: 129 MMHG | WEIGHT: 310 LBS | DIASTOLIC BLOOD PRESSURE: 73 MMHG

## 2019-07-03 DIAGNOSIS — E87.5 HYPERKALEMIA: ICD-10-CM

## 2019-07-03 PROCEDURE — 99214 OFFICE O/P EST MOD 30 MIN: CPT

## 2019-07-03 RX ORDER — FEXOFENADINE HCL 60 MG/1
60 TABLET, FILM COATED ORAL
Qty: 30 | Refills: 0 | Status: DISCONTINUED | COMMUNITY
Start: 2019-04-10 | End: 2019-07-03

## 2019-07-05 LAB
25(OH)D3 SERPL-MCNC: 57.1 NG/ML
ALBUMIN SERPL ELPH-MCNC: 3.6 G/DL
ANION GAP SERPL CALC-SCNC: 12 MMOL/L
BASOPHILS # BLD AUTO: 0.08 K/UL
BASOPHILS NFR BLD AUTO: 0.8 %
BUN SERPL-MCNC: 34 MG/DL
CALCIUM SERPL-MCNC: 9.5 MG/DL
CALCIUM SERPL-MCNC: 9.5 MG/DL
CHLORIDE SERPL-SCNC: 101 MMOL/L
CO2 SERPL-SCNC: 30 MMOL/L
CREAT SERPL-MCNC: 2.42 MG/DL
CREAT SPEC-SCNC: 142 MG/DL
CREAT/PROT UR: 5.2 RATIO
EOSINOPHIL # BLD AUTO: 0.6 K/UL
EOSINOPHIL NFR BLD AUTO: 6 %
ESTIMATED AVERAGE GLUCOSE: 166 MG/DL
GLUCOSE SERPL-MCNC: 118 MG/DL
HBA1C MFR BLD HPLC: 7.4 %
HCT VFR BLD CALC: 49 %
HGB BLD-MCNC: 15.5 G/DL
IMM GRANULOCYTES NFR BLD AUTO: 0.4 %
LYMPHOCYTES # BLD AUTO: 2.19 K/UL
LYMPHOCYTES NFR BLD AUTO: 21.8 %
MAGNESIUM SERPL-MCNC: 1.5 MG/DL
MAN DIFF?: NORMAL
MCHC RBC-ENTMCNC: 30.3 PG
MCHC RBC-ENTMCNC: 31.6 GM/DL
MCV RBC AUTO: 95.7 FL
MONOCYTES # BLD AUTO: 0.89 K/UL
MONOCYTES NFR BLD AUTO: 8.9 %
NEUTROPHILS # BLD AUTO: 6.23 K/UL
NEUTROPHILS NFR BLD AUTO: 62.1 %
PARATHYROID HORMONE INTACT: 63 PG/ML
PHOSPHATE SERPL-MCNC: 2.8 MG/DL
PLATELET # BLD AUTO: 226 K/UL
POTASSIUM SERPL-SCNC: 4.2 MMOL/L
PROT UR-MCNC: 733 MG/DL
RBC # BLD: 5.12 M/UL
RBC # FLD: 13.9 %
SODIUM SERPL-SCNC: 142 MMOL/L
WBC # FLD AUTO: 10.03 K/UL

## 2019-07-05 NOTE — PHYSICAL EXAM
[General Appearance - Alert] : alert [General Appearance - In No Acute Distress] : in no acute distress [General Appearance - Well Developed] : well developed [Sclera] : the sclera and conjunctiva were normal [Oropharynx] : the oropharynx was normal [PERRL With Normal Accommodation] : pupils were equal in size, round, and reactive to light [Neck Appearance] : the appearance of the neck was normal [Jugular Venous Distention Increased] : there was no jugular-venous distention [Thyroid Diffuse Enlargement] : the thyroid was not enlarged [Auscultation Breath Sounds / Voice Sounds] : lungs were clear to auscultation bilaterally [Heart Rate And Rhythm] : heart rate was normal and rhythm regular [Heart Sounds] : normal S1 and S2 [Heart Sounds Gallop] : no gallops [Murmurs] : no murmurs [Heart Sounds Pericardial Friction Rub] : no pericardial rub [Bowel Sounds] : normal bowel sounds [Edema] : there was no peripheral edema [Abdomen Soft] : soft [Abdomen Tenderness] : non-tender [Abdomen Mass (___ Cm)] : no abdominal mass palpated [Abnormal Walk] : normal gait [Abdomen Hernia] : no hernia was discovered [Musculoskeletal - Swelling] : no joint swelling seen [FreeTextEntry1] : using cane  [] : no rash [No Focal Deficits] : no focal deficits [Oriented To Time, Place, And Person] : oriented to person, place, and time

## 2019-07-05 NOTE — ASSESSMENT
[FreeTextEntry1] : 68 y.o M with PMHx mentioned above, following for CKD stage 4 with proteinuria,  secondary to HTN and DM and obesity( sec FSGS) , stable.\par \par CKD stage 4. \par -Stable,today cr 2.42 GFR 27, K 5.2 \par -Has proteinuria, on losartan. 5.5 down from 9 grams. \par -On Bicarbonate tablets due to metabolic acidosis\par \par Hyperkalemia:\par - On Kayexalate\par - Continue to monitor K, \par \par Hypertension:\par -well controlled, continue current regimen. \par \par Mineral Bone disorder. \par - PTH and Vitamin normal. A1c 7.2 %\par \par \par RTC in 3-4 months.

## 2019-07-05 NOTE — REVIEW OF SYSTEMS
[Fever] : no fever [Feeling Poorly] : not feeling poorly [Chills] : no chills [Feeling Tired] : not feeling tired [Recent Weight Gain (___ Lbs)] : no recent weight gain [Recent Weight Loss (___ Lbs)] : no recent weight loss [Eyesight Problems] : no eyesight problems [Loss Of Hearing] : no hearing loss [Sore Throat] : no sore throat [Chest Pain] : no chest pain [Palpitations] : no palpitations [Leg Claudication] : no intermittent leg claudication [Lower Ext Edema] : no extremity edema [Cough] : no cough [Shortness Of Breath] : no shortness of breath [SOB on Exertion] : no shortness of breath during exertion [Orthopnea] : no orthopnea [Abdominal Pain] : no abdominal pain [Vomiting] : no vomiting [Diarrhea] : no diarrhea [Dysuria] : no dysuria [Hesitancy] : no urinary hesitancy [Joint Swelling] : no joint swelling [Limb Swelling] : no limb swelling [Dizziness] : no dizziness [Fainting] : no fainting [Limb Weakness] : no limb weakness [Difficulty Walking] : difficulty walking [Negative] : Heme/Lymph [FreeTextEntry9] :  Hip pain.  [de-identified] : Due to hip pain.

## 2019-07-05 NOTE — HISTORY OF PRESENT ILLNESS
[FreeTextEntry1] : seen by Dr. Browning for kidney stones, all stable.\par has been on Higher doses of losartan due to worsening proteinuria and taking Kayexalate for hyperkalemia. dealing with  hip pain and seeking to do an operation in the fall. no significant  swelling.

## 2019-07-18 ENCOUNTER — APPOINTMENT (OUTPATIENT)
Dept: INTERNAL MEDICINE | Facility: CLINIC | Age: 69
End: 2019-07-18
Payer: MEDICARE

## 2019-07-18 VITALS
HEART RATE: 85 BPM | SYSTOLIC BLOOD PRESSURE: 150 MMHG | HEIGHT: 67 IN | OXYGEN SATURATION: 94 % | DIASTOLIC BLOOD PRESSURE: 80 MMHG

## 2019-07-18 PROCEDURE — 99214 OFFICE O/P EST MOD 30 MIN: CPT

## 2019-08-05 ENCOUNTER — RX RENEWAL (OUTPATIENT)
Age: 69
End: 2019-08-05

## 2019-08-09 NOTE — PLAN
[FreeTextEntry1] : to see orthopedics -- referral again given. urging pt to see cardiology as well to begin pre-op process - he has had abnormal though stable ekg for years and refused to act upon need for evaluation.  stress/echo likely needed to assess prior to possible thr.  to see orthopedics as well. encouraging continuing use of cane.

## 2019-08-09 NOTE — HISTORY OF PRESENT ILLNESS
[FreeTextEntry1] : follow up [de-identified] : Pt again working on weight more seriously.  understands how crucial this is to controlling pain at hip.  has been working on getting diet into order again to limit blood lucose as well.  has had tr hip and thigh pain. is using cane now for stability/reassurance though not walking very far.  intends to go for thr though remains reluctant to establish with orthopedics. due to see nephrology again soon. understands need for cardiac workup as prelude to clearance.

## 2019-08-09 NOTE — PHYSICAL EXAM
[No Acute Distress] : no acute distress [Well Nourished] : well nourished [Well-Appearing] : well-appearing [Well Developed] : well developed [Normal Sclera/Conjunctiva] : normal sclera/conjunctiva [PERRL] : pupils equal round and reactive to light [Normal Outer Ear/Nose] : the outer ears and nose were normal in appearance [EOMI] : extraocular movements intact [Normal Oropharynx] : the oropharynx was normal [No JVD] : no jugular venous distention [No Lymphadenopathy] : no lymphadenopathy [Thyroid Normal, No Nodules] : the thyroid was normal and there were no nodules present [Supple] : supple [No Respiratory Distress] : no respiratory distress  [No Accessory Muscle Use] : no accessory muscle use [Normal Rate] : normal rate  [Clear to Auscultation] : lungs were clear to auscultation bilaterally [Normal S1, S2] : normal S1 and S2 [Regular Rhythm] : with a regular rhythm [No Carotid Bruits] : no carotid bruits [No Murmur] : no murmur heard [No Varicosities] : no varicosities [No Abdominal Bruit] : a ~M bruit was not heard ~T in the abdomen [No Edema] : there was no peripheral edema [Pedal Pulses Present] : the pedal pulses are present [No Extremity Clubbing/Cyanosis] : no extremity clubbing/cyanosis [No Palpable Aorta] : no palpable aorta [Non Tender] : non-tender [Soft] : abdomen soft [Non-distended] : non-distended [No Masses] : no abdominal mass palpated [No HSM] : no HSM [Normal Bowel Sounds] : normal bowel sounds [Normal Posterior Cervical Nodes] : no posterior cervical lymphadenopathy [Normal Anterior Cervical Nodes] : no anterior cervical lymphadenopathy [No CVA Tenderness] : no CVA  tenderness [No Spinal Tenderness] : no spinal tenderness [Grossly Normal Strength/Tone] : grossly normal strength/tone [No Joint Swelling] : no joint swelling [Coordination Grossly Intact] : coordination grossly intact [No Rash] : no rash [No Focal Deficits] : no focal deficits [Normal Affect] : the affect was normal [Deep Tendon Reflexes (DTR)] : deep tendon reflexes were 2+ and symmetric [Normal Insight/Judgement] : insight and judgment were intact [de-identified] : overweight [de-identified] : tenderness r hip [de-identified] : antalgic gait

## 2019-08-12 ENCOUNTER — RX RENEWAL (OUTPATIENT)
Age: 69
End: 2019-08-12

## 2019-08-21 ENCOUNTER — RX RENEWAL (OUTPATIENT)
Age: 69
End: 2019-08-21

## 2019-10-02 ENCOUNTER — APPOINTMENT (OUTPATIENT)
Dept: NEPHROLOGY | Facility: CLINIC | Age: 69
End: 2019-10-02

## 2019-10-25 ENCOUNTER — MEDICATION RENEWAL (OUTPATIENT)
Age: 69
End: 2019-10-25

## 2019-10-28 ENCOUNTER — RX RENEWAL (OUTPATIENT)
Age: 69
End: 2019-10-28

## 2019-10-29 ENCOUNTER — APPOINTMENT (OUTPATIENT)
Dept: INTERNAL MEDICINE | Facility: CLINIC | Age: 69
End: 2019-10-29
Payer: MEDICARE

## 2019-10-29 VITALS
DIASTOLIC BLOOD PRESSURE: 74 MMHG | HEART RATE: 92 BPM | SYSTOLIC BLOOD PRESSURE: 150 MMHG | OXYGEN SATURATION: 95 % | WEIGHT: 310 LBS | HEIGHT: 67 IN | BODY MASS INDEX: 48.65 KG/M2

## 2019-10-29 PROCEDURE — 90662 IIV NO PRSV INCREASED AG IM: CPT

## 2019-10-29 PROCEDURE — 99214 OFFICE O/P EST MOD 30 MIN: CPT | Mod: 25

## 2019-10-29 PROCEDURE — G0008: CPT

## 2019-11-03 NOTE — PHYSICAL EXAM
[No Acute Distress] : no acute distress [Well Nourished] : well nourished [Well Developed] : well developed [Well-Appearing] : well-appearing [Normal Sclera/Conjunctiva] : normal sclera/conjunctiva [PERRL] : pupils equal round and reactive to light [EOMI] : extraocular movements intact [Normal Outer Ear/Nose] : the outer ears and nose were normal in appearance [Normal Oropharynx] : the oropharynx was normal [No JVD] : no jugular venous distention [No Lymphadenopathy] : no lymphadenopathy [Supple] : supple [Thyroid Normal, No Nodules] : the thyroid was normal and there were no nodules present [No Respiratory Distress] : no respiratory distress  [No Accessory Muscle Use] : no accessory muscle use [Clear to Auscultation] : lungs were clear to auscultation bilaterally [Normal Rate] : normal rate  [Regular Rhythm] : with a regular rhythm [Normal S1, S2] : normal S1 and S2 [No Murmur] : no murmur heard [No Carotid Bruits] : no carotid bruits [No Abdominal Bruit] : a ~M bruit was not heard ~T in the abdomen [No Varicosities] : no varicosities [Pedal Pulses Present] : the pedal pulses are present [No Edema] : there was no peripheral edema [No Palpable Aorta] : no palpable aorta [No Extremity Clubbing/Cyanosis] : no extremity clubbing/cyanosis [Soft] : abdomen soft [Non Tender] : non-tender [Non-distended] : non-distended [No Masses] : no abdominal mass palpated [No HSM] : no HSM [Normal Bowel Sounds] : normal bowel sounds [Normal Posterior Cervical Nodes] : no posterior cervical lymphadenopathy [Normal Anterior Cervical Nodes] : no anterior cervical lymphadenopathy [No CVA Tenderness] : no CVA  tenderness [No Spinal Tenderness] : no spinal tenderness [No Joint Swelling] : no joint swelling [Grossly Normal Strength/Tone] : grossly normal strength/tone [No Rash] : no rash [Coordination Grossly Intact] : coordination grossly intact [No Focal Deficits] : no focal deficits [Deep Tendon Reflexes (DTR)] : deep tendon reflexes were 2+ and symmetric [Normal Affect] : the affect was normal [Normal Insight/Judgement] : insight and judgment were intact [de-identified] : overweight [de-identified] : tenderness r hip [de-identified] : antalgic gait

## 2019-11-03 NOTE — HISTORY OF PRESENT ILLNESS
[FreeTextEntry1] : follow up [de-identified] : Pt is completing long slog through dental work. he has been reluctant to deal with any other consultations until this series of visits is complete.  he however has kept the references we have given regarding cardiology and orthopedics - both of which are imperative for him.  he understands that surgery upon r hip is only real option to tx.  ultimately further weight loss will be extremely difficult to achieve without mobility which only surgery has a chance of restoring.  fs this  but he has not been checking truly regularly.  diet has been more variable again.  he has been following with nephrology and urology.

## 2019-11-03 NOTE — PLAN
[FreeTextEntry1] : Pt must resume best approach to diet to bring weight down below 300#.  he will need to visit with orthopedics and cardiology for evaluation.  he understands that cardiology evaluation is long overdue.  some activity is key for metabolics and weight and joint health.  checking bloods favian for dm parameters.  need not wait until dental work completed to pursue the above - we discussed this.

## 2019-11-20 ENCOUNTER — APPOINTMENT (OUTPATIENT)
Dept: UROLOGY | Facility: CLINIC | Age: 69
End: 2019-11-20
Payer: MEDICARE

## 2019-11-20 ENCOUNTER — OUTPATIENT (OUTPATIENT)
Dept: OUTPATIENT SERVICES | Facility: HOSPITAL | Age: 69
LOS: 1 days | End: 2019-11-20
Payer: MEDICARE

## 2019-11-20 DIAGNOSIS — M19.90 UNSPECIFIED OSTEOARTHRITIS, UNSPECIFIED SITE: ICD-10-CM

## 2019-11-20 DIAGNOSIS — Z12.5 ENCOUNTER FOR SCREENING FOR MALIGNANT NEOPLASM OF PROSTATE: ICD-10-CM

## 2019-11-20 DIAGNOSIS — R35.0 FREQUENCY OF MICTURITION: ICD-10-CM

## 2019-11-20 PROCEDURE — 76775 US EXAM ABDO BACK WALL LIM: CPT

## 2019-11-20 PROCEDURE — 76775 US EXAM ABDO BACK WALL LIM: CPT | Mod: 26

## 2019-11-20 PROCEDURE — 99214 OFFICE O/P EST MOD 30 MIN: CPT | Mod: 25

## 2019-11-20 NOTE — PHYSICAL EXAM
[Skin Color & Pigmentation] : normal skin color and pigmentation [Heart Rate And Rhythm] : Heart rate and rhythm were normal [] : no respiratory distress [Respiration, Rhythm And Depth] : normal respiratory rhythm and effort [Oriented To Time, Place, And Person] : oriented to person, place, and time [Not Anxious] : not anxious [General Appearance - Well Developed] : well developed [General Appearance - Well Nourished] : well nourished [Well Groomed] : well groomed [Normal Appearance] : normal appearance [General Appearance - In No Acute Distress] : no acute distress [Abdomen Soft] : soft [Abdomen Tenderness] : non-tender [Costovertebral Angle Tenderness] : no ~M costovertebral angle tenderness [Affect] : the affect was normal [Mood] : the mood was normal [FreeTextEntry1] : using cane, hip pain

## 2019-11-20 NOTE — ASSESSMENT
[FreeTextEntry1] : Maintain increased fluids intake, low salt diet.\par 24 hr urine and renal sono before next visit.\par f/u 12 months\par

## 2019-11-20 NOTE — HISTORY OF PRESENT ILLNESS
[FreeTextEntry1] : 67 y/o man hx of kidney stones, CKD\par Here for f/u\par \par Renal sono: no obvious stones, ?calcification with shadowing 5 mm L kidney , no pain and asymptomatic\par \par 24 hr urine: not ordered\par \par PSA 5/2019: 0.17\par Cr 2.34 (oct 2019)\par \par Patient is currently experiencing no symptoms. \par Pain Level: None  [Urinary Retention] : no urinary retention [Urinary Incontinence] : no urinary incontinence [Urinary Urgency] : no urinary urgency

## 2019-11-27 DIAGNOSIS — N20.0 CALCULUS OF KIDNEY: ICD-10-CM

## 2019-11-27 DIAGNOSIS — E66.9 OBESITY, UNSPECIFIED: ICD-10-CM

## 2019-11-27 DIAGNOSIS — M19.90 UNSPECIFIED OSTEOARTHRITIS, UNSPECIFIED SITE: ICD-10-CM

## 2019-11-27 DIAGNOSIS — Z12.5 ENCOUNTER FOR SCREENING FOR MALIGNANT NEOPLASM OF PROSTATE: ICD-10-CM

## 2019-11-27 DIAGNOSIS — N18.4 CHRONIC KIDNEY DISEASE, STAGE 4 (SEVERE): ICD-10-CM

## 2019-12-09 ENCOUNTER — RX RENEWAL (OUTPATIENT)
Age: 69
End: 2019-12-09

## 2020-01-01 ENCOUNTER — APPOINTMENT (OUTPATIENT)
Dept: UROLOGY | Facility: CLINIC | Age: 70
End: 2020-01-01
Payer: MEDICARE

## 2020-01-01 ENCOUNTER — APPOINTMENT (OUTPATIENT)
Dept: NEPHROLOGY | Facility: CLINIC | Age: 70
End: 2020-01-01
Payer: MEDICARE

## 2020-01-01 ENCOUNTER — APPOINTMENT (OUTPATIENT)
Dept: INTERNAL MEDICINE | Facility: CLINIC | Age: 70
End: 2020-01-01
Payer: MEDICARE

## 2020-01-01 ENCOUNTER — RX RENEWAL (OUTPATIENT)
Age: 70
End: 2020-01-01

## 2020-01-01 ENCOUNTER — OUTPATIENT (OUTPATIENT)
Dept: OUTPATIENT SERVICES | Facility: HOSPITAL | Age: 70
LOS: 1 days | End: 2020-01-01
Payer: MEDICARE

## 2020-01-01 VITALS
DIASTOLIC BLOOD PRESSURE: 88 MMHG | WEIGHT: 306 LBS | OXYGEN SATURATION: 93 % | BODY MASS INDEX: 47.93 KG/M2 | SYSTOLIC BLOOD PRESSURE: 156 MMHG | HEART RATE: 86 BPM

## 2020-01-01 VITALS — TEMPERATURE: 97.4 F

## 2020-01-01 DIAGNOSIS — N20.0 CALCULUS OF KIDNEY: ICD-10-CM

## 2020-01-01 DIAGNOSIS — R35.0 FREQUENCY OF MICTURITION: ICD-10-CM

## 2020-01-01 DIAGNOSIS — N18.4 CHRONIC KIDNEY DISEASE, STAGE 4 (SEVERE): ICD-10-CM

## 2020-01-01 DIAGNOSIS — N18.30 CHRONIC KIDNEY DISEASE, STAGE 3 UNSPECIFIED: ICD-10-CM

## 2020-01-01 LAB
25(OH)D3 SERPL-MCNC: 48.4 NG/ML
ALBUMIN SERPL ELPH-MCNC: 3.6 G/DL
ANION GAP SERPL CALC-SCNC: 11 MMOL/L
ANION GAP SERPL CALC-SCNC: 14 MMOL/L
APPEARANCE: CLEAR
BACTERIA: ABNORMAL
BASOPHILS # BLD AUTO: 0.09 K/UL
BASOPHILS NFR BLD AUTO: 1 %
BILIRUBIN URINE: NEGATIVE
BLOOD URINE: ABNORMAL
BUN SERPL-MCNC: 45 MG/DL
BUN SERPL-MCNC: 47 MG/DL
CALCIUM SERPL-MCNC: 9 MG/DL
CALCIUM SERPL-MCNC: 9.2 MG/DL
CALCIUM SERPL-MCNC: 9.2 MG/DL
CHLORIDE SERPL-SCNC: 104 MMOL/L
CHLORIDE SERPL-SCNC: 105 MMOL/L
CO2 SERPL-SCNC: 22 MMOL/L
CO2 SERPL-SCNC: 26 MMOL/L
COLOR: YELLOW
CREAT SERPL-MCNC: 3.1 MG/DL
CREAT SERPL-MCNC: 3.56 MG/DL
CREAT SPEC-SCNC: 94 MG/DL
CREAT/PROT UR: 10.3 RATIO
EOSINOPHIL # BLD AUTO: 0.44 K/UL
EOSINOPHIL NFR BLD AUTO: 4.7 %
FERRITIN SERPL-MCNC: 39 NG/ML
GLUCOSE QUALITATIVE U: ABNORMAL
GLUCOSE SERPL-MCNC: 126 MG/DL
GLUCOSE SERPL-MCNC: 142 MG/DL
GRANULAR CASTS: 1 /LPF
HCT VFR BLD CALC: 50.2 %
HGB BLD-MCNC: 14.9 G/DL
HYALINE CASTS: 4 /LPF
IMM GRANULOCYTES NFR BLD AUTO: 0.3 %
IRON SATN MFR SERPL: 18 %
IRON SERPL-MCNC: 54 UG/DL
KETONES URINE: NEGATIVE
LEUKOCYTE ESTERASE URINE: ABNORMAL
LYMPHOCYTES # BLD AUTO: 2.11 K/UL
LYMPHOCYTES NFR BLD AUTO: 22.4 %
MAN DIFF?: NORMAL
MCHC RBC-ENTMCNC: 28 PG
MCHC RBC-ENTMCNC: 29.7 GM/DL
MCV RBC AUTO: 94.2 FL
MICROSCOPIC-UA: NORMAL
MONOCYTES # BLD AUTO: 0.76 K/UL
MONOCYTES NFR BLD AUTO: 8.1 %
NEUTROPHILS # BLD AUTO: 6 K/UL
NEUTROPHILS NFR BLD AUTO: 63.5 %
NITRITE URINE: NEGATIVE
PARATHYROID HORMONE INTACT: 170 PG/ML
PH URINE: 6.5
PHOSPHATE SERPL-MCNC: 3.8 MG/DL
PLATELET # BLD AUTO: 233 K/UL
POTASSIUM SERPL-SCNC: 4.8 MMOL/L
POTASSIUM SERPL-SCNC: 5 MMOL/L
PROT UR-MCNC: 967 MG/DL
PROTEIN URINE: ABNORMAL
RBC # BLD: 5.33 M/UL
RBC # FLD: 15 %
RED BLOOD CELLS URINE: 3 /HPF
SODIUM SERPL-SCNC: 140 MMOL/L
SODIUM SERPL-SCNC: 143 MMOL/L
SPECIFIC GRAVITY URINE: 1.02
SQUAMOUS EPITHELIAL CELLS: 19 /HPF
TIBC SERPL-MCNC: 307 UG/DL
UIBC SERPL-MCNC: 253 UG/DL
URINE COMMENTS: NORMAL
UROBILINOGEN URINE: NORMAL
WBC # FLD AUTO: 9.43 K/UL
WHITE BLOOD CELLS URINE: 84 /HPF

## 2020-01-01 PROCEDURE — 76775 US EXAM ABDO BACK WALL LIM: CPT | Mod: 26

## 2020-01-01 PROCEDURE — 99214 OFFICE O/P EST MOD 30 MIN: CPT | Mod: 25

## 2020-01-01 PROCEDURE — 99214 OFFICE O/P EST MOD 30 MIN: CPT

## 2020-01-01 PROCEDURE — 99443: CPT | Mod: 95

## 2020-01-01 PROCEDURE — 76775 US EXAM ABDO BACK WALL LIM: CPT

## 2020-01-02 ENCOUNTER — RX RENEWAL (OUTPATIENT)
Age: 70
End: 2020-01-02

## 2020-01-08 ENCOUNTER — APPOINTMENT (OUTPATIENT)
Dept: NEPHROLOGY | Facility: CLINIC | Age: 70
End: 2020-01-08
Payer: MEDICARE

## 2020-01-08 VITALS
WEIGHT: 310 LBS | HEART RATE: 94 BPM | OXYGEN SATURATION: 96 % | HEIGHT: 67 IN | BODY MASS INDEX: 48.65 KG/M2 | DIASTOLIC BLOOD PRESSURE: 102 MMHG | SYSTOLIC BLOOD PRESSURE: 176 MMHG

## 2020-01-08 VITALS — DIASTOLIC BLOOD PRESSURE: 80 MMHG | SYSTOLIC BLOOD PRESSURE: 130 MMHG

## 2020-01-08 PROCEDURE — 99214 OFFICE O/P EST MOD 30 MIN: CPT | Mod: GC

## 2020-01-09 LAB
25(OH)D3 SERPL-MCNC: 58.9 NG/ML
ALBUMIN SERPL ELPH-MCNC: 3.7 G/DL
ANION GAP SERPL CALC-SCNC: 17 MMOL/L
APPEARANCE: CLEAR
BACTERIA: ABNORMAL
BASOPHILS # BLD AUTO: 0.08 K/UL
BASOPHILS NFR BLD AUTO: 0.9 %
BILIRUBIN URINE: NEGATIVE
BLOOD URINE: ABNORMAL
BUN SERPL-MCNC: 43 MG/DL
CALCIUM SERPL-MCNC: 9.6 MG/DL
CALCIUM SERPL-MCNC: 9.6 MG/DL
CHLORIDE SERPL-SCNC: 99 MMOL/L
CO2 SERPL-SCNC: 25 MMOL/L
COLOR: YELLOW
CREAT SERPL-MCNC: 2.85 MG/DL
EOSINOPHIL # BLD AUTO: 0.39 K/UL
EOSINOPHIL NFR BLD AUTO: 4.3 %
GLUCOSE QUALITATIVE U: ABNORMAL
GLUCOSE SERPL-MCNC: 235 MG/DL
HCT VFR BLD CALC: 52.8 %
HGB BLD-MCNC: 16.7 G/DL
HYALINE CASTS: 4 /LPF
IMM GRANULOCYTES NFR BLD AUTO: 0.4 %
KETONES URINE: NEGATIVE
LEUKOCYTE ESTERASE URINE: ABNORMAL
LYMPHOCYTES # BLD AUTO: 1.77 K/UL
LYMPHOCYTES NFR BLD AUTO: 19.3 %
MAN DIFF?: NORMAL
MCHC RBC-ENTMCNC: 30.6 PG
MCHC RBC-ENTMCNC: 31.6 GM/DL
MCV RBC AUTO: 96.7 FL
MICROSCOPIC-UA: NORMAL
MONOCYTES # BLD AUTO: 0.68 K/UL
MONOCYTES NFR BLD AUTO: 7.4 %
NEUTROPHILS # BLD AUTO: 6.21 K/UL
NEUTROPHILS NFR BLD AUTO: 67.7 %
NITRITE URINE: NEGATIVE
PARATHYROID HORMONE INTACT: 88 PG/ML
PH URINE: 6.5
PHOSPHATE SERPL-MCNC: 2.9 MG/DL
PLATELET # BLD AUTO: 237 K/UL
POTASSIUM SERPL-SCNC: 4.8 MMOL/L
PROTEIN URINE: ABNORMAL
RBC # BLD: 5.46 M/UL
RBC # FLD: 14.1 %
RED BLOOD CELLS URINE: 6 /HPF
SODIUM SERPL-SCNC: 141 MMOL/L
SPECIFIC GRAVITY URINE: 1.02
SQUAMOUS EPITHELIAL CELLS: 5 /HPF
UROBILINOGEN URINE: NORMAL
WBC # FLD AUTO: 9.17 K/UL
WHITE BLOOD CELLS URINE: 34 /HPF

## 2020-01-09 NOTE — ASSESSMENT
[FreeTextEntry1] : 69 y.o M with PMHx mentioned above, following for CKD stage 4 with proteinuria,  secondary to HTN and DM and obesity( sec FSGS) , stable.\par \par CKD stage 4. \par -Creatinine elevated to 2.82 today likely due to diarrhea, will need to continue to eat and drink appropriately\par -Has proteinuria, on losartan, not quantified, will ask lab to add on protein/creatinine\par -On Bicarbonate tablets due to metabolic acidosis, will restart\par \par Hyperkalemia:\par - On Kayexalate\par - Continue to monitor K, currently 4.8\par \par Hypertension:\par -well controlled, continue current regimen. \par \par Mineral Bone disorder. \par - PTH and Vitamin normal. A1c 7.2 %\par \par \par RTC in 3-4 months.

## 2020-01-09 NOTE — PHYSICAL EXAM
[General Appearance - Alert] : alert [General Appearance - In No Acute Distress] : in no acute distress [General Appearance - Well Developed] : well developed [Sclera] : the sclera and conjunctiva were normal [PERRL With Normal Accommodation] : pupils were equal in size, round, and reactive to light [Oropharynx] : the oropharynx was normal [Neck Appearance] : the appearance of the neck was normal [Jugular Venous Distention Increased] : there was no jugular-venous distention [Thyroid Diffuse Enlargement] : the thyroid was not enlarged [Auscultation Breath Sounds / Voice Sounds] : lungs were clear to auscultation bilaterally [Heart Rate And Rhythm] : heart rate was normal and rhythm regular [Heart Sounds] : normal S1 and S2 [Heart Sounds Gallop] : no gallops [Murmurs] : no murmurs [Heart Sounds Pericardial Friction Rub] : no pericardial rub [Edema] : there was no peripheral edema [Bowel Sounds] : normal bowel sounds [Abdomen Soft] : soft [Abdomen Tenderness] : non-tender [Abdomen Mass (___ Cm)] : no abdominal mass palpated [Abdomen Hernia] : no hernia was discovered [Abnormal Walk] : normal gait [Musculoskeletal - Swelling] : no joint swelling seen [] : no rash [No Focal Deficits] : no focal deficits [Oriented To Time, Place, And Person] : oriented to person, place, and time [FreeTextEntry1] : using cane

## 2020-01-09 NOTE — HISTORY OF PRESENT ILLNESS
[FreeTextEntry1] : Pt presented to clinic for routine visit. Patient states he has been compliant with medications however had run out of sodium bicarbonate, Vitamin D. He also states that he recently started new diet and has been having diarrhea from this. He states otherwise has been at normal state of health, no major issues and otherwise has no complaints. He had seen Dr. Browning who would like patient to do repeat 24 hr urine collection. Discussed results of previous 24 hr urine collection and discussed lowering oxalate consumption. No other major issues.

## 2020-01-09 NOTE — END OF VISIT
[] : Fellow [FreeTextEntry3] : hyperoxaluria; was advised to avoid high oxalate food. a list was given

## 2020-01-09 NOTE — REVIEW OF SYSTEMS
[Difficulty Walking] : difficulty walking [Negative] : Heme/Lymph [Fever] : no fever [Chills] : no chills [Feeling Tired] : not feeling tired [Feeling Poorly] : not feeling poorly [Recent Weight Gain (___ Lbs)] : no recent weight gain [Recent Weight Loss (___ Lbs)] : no recent weight loss [Loss Of Hearing] : no hearing loss [Eyesight Problems] : no eyesight problems [Sore Throat] : no sore throat [Leg Claudication] : no intermittent leg claudication [Palpitations] : no palpitations [Chest Pain] : no chest pain [Shortness Of Breath] : no shortness of breath [Lower Ext Edema] : no extremity edema [SOB on Exertion] : no shortness of breath during exertion [Cough] : no cough [Abdominal Pain] : no abdominal pain [Orthopnea] : no orthopnea [Vomiting] : no vomiting [Dysuria] : no dysuria [Diarrhea] : no diarrhea [Hesitancy] : no urinary hesitancy [Joint Swelling] : no joint swelling [Dizziness] : no dizziness [Limb Swelling] : no limb swelling [Fainting] : no fainting [Limb Weakness] : no limb weakness [FreeTextEntry9] :  Hip pain.  [de-identified] : Due to hip pain.

## 2020-01-10 LAB
CREAT SPEC-SCNC: 91 MG/DL
CREAT/PROT UR: 9.8 RATIO
PROT UR-MCNC: 891 MG/DL

## 2020-02-03 ENCOUNTER — RX RENEWAL (OUTPATIENT)
Age: 70
End: 2020-02-03

## 2020-02-05 LAB
ALBUMIN SERPL ELPH-MCNC: 3.6 G/DL
ANION GAP SERPL CALC-SCNC: 12 MMOL/L
BUN SERPL-MCNC: 30 MG/DL
CALCIUM SERPL-MCNC: 9.5 MG/DL
CHLORIDE SERPL-SCNC: 103 MMOL/L
CO2 SERPL-SCNC: 30 MMOL/L
CREAT SERPL-MCNC: 2.57 MG/DL
CREAT SPEC-SCNC: 33 MG/DL
CREAT/PROT UR: 11 RATIO
GLUCOSE SERPL-MCNC: 135 MG/DL
PHOSPHATE SERPL-MCNC: 2.7 MG/DL
POTASSIUM SERPL-SCNC: 4.2 MMOL/L
PROT UR-MCNC: 358 MG/DL
SODIUM SERPL-SCNC: 145 MMOL/L

## 2020-03-04 ENCOUNTER — RX RENEWAL (OUTPATIENT)
Age: 70
End: 2020-03-04

## 2020-03-05 ENCOUNTER — RX RENEWAL (OUTPATIENT)
Age: 70
End: 2020-03-05

## 2020-03-08 ENCOUNTER — RX RENEWAL (OUTPATIENT)
Age: 70
End: 2020-03-08

## 2020-03-10 ENCOUNTER — APPOINTMENT (OUTPATIENT)
Dept: INTERNAL MEDICINE | Facility: CLINIC | Age: 70
End: 2020-03-10
Payer: MEDICARE

## 2020-03-10 VITALS
HEIGHT: 67 IN | BODY MASS INDEX: 47.4 KG/M2 | HEART RATE: 86 BPM | OXYGEN SATURATION: 96 % | SYSTOLIC BLOOD PRESSURE: 130 MMHG | WEIGHT: 302 LBS | DIASTOLIC BLOOD PRESSURE: 74 MMHG

## 2020-03-10 DIAGNOSIS — K04.7 PERIAPICAL ABSCESS W/OUT SINUS: ICD-10-CM

## 2020-03-10 PROCEDURE — 99214 OFFICE O/P EST MOD 30 MIN: CPT

## 2020-03-29 LAB
ALBUMIN SERPL ELPH-MCNC: 3.7 G/DL
ALP BLD-CCNC: 77 U/L
ALT SERPL-CCNC: 37 U/L
ANION GAP SERPL CALC-SCNC: 17 MMOL/L
AST SERPL-CCNC: 26 U/L
BASOPHILS # BLD AUTO: 0.07 K/UL
BASOPHILS NFR BLD AUTO: 0.8 %
BILIRUB SERPL-MCNC: 0.8 MG/DL
BUN SERPL-MCNC: 36 MG/DL
CALCIUM SERPL-MCNC: 9.7 MG/DL
CHLORIDE SERPL-SCNC: 100 MMOL/L
CHOLEST SERPL-MCNC: 164 MG/DL
CHOLEST/HDLC SERPL: 4.6 RATIO
CO2 SERPL-SCNC: 22 MMOL/L
CREAT SERPL-MCNC: 2.34 MG/DL
EOSINOPHIL # BLD AUTO: 0.49 K/UL
EOSINOPHIL NFR BLD AUTO: 5.9 %
ESTIMATED AVERAGE GLUCOSE: 163 MG/DL
GLUCOSE SERPL-MCNC: 157 MG/DL
HBA1C MFR BLD HPLC: 7.3 %
HCT VFR BLD CALC: 50.3 %
HDLC SERPL-MCNC: 36 MG/DL
HGB BLD-MCNC: 16.1 G/DL
IMM GRANULOCYTES NFR BLD AUTO: 0.2 %
LDLC SERPL CALC-MCNC: 74 MG/DL
LYMPHOCYTES # BLD AUTO: 1.76 K/UL
LYMPHOCYTES NFR BLD AUTO: 21.1 %
MAN DIFF?: NORMAL
MCHC RBC-ENTMCNC: 30.8 PG
MCHC RBC-ENTMCNC: 32 GM/DL
MCV RBC AUTO: 96.2 FL
MONOCYTES # BLD AUTO: 0.72 K/UL
MONOCYTES NFR BLD AUTO: 8.6 %
NEUTROPHILS # BLD AUTO: 5.28 K/UL
NEUTROPHILS NFR BLD AUTO: 63.4 %
PLATELET # BLD AUTO: 212 K/UL
POTASSIUM SERPL-SCNC: 4.4 MMOL/L
PROT SERPL-MCNC: 6.8 G/DL
RBC # BLD: 5.23 M/UL
RBC # FLD: 14.1 %
SODIUM SERPL-SCNC: 139 MMOL/L
TRIGL SERPL-MCNC: 269 MG/DL
WBC # FLD AUTO: 8.34 K/UL

## 2020-04-02 NOTE — PLAN
[FreeTextEntry1] : Pt has been working again admirably on reducing weight via diet.  Goals are to optimize dm control and become stronger candidate for THR once the current unpleasantness has resolved.  He will continue to follow closely with Dr. Velasco as well. To start checking FS more routinely again at home.  Has been helpful to have pen vk for dental work when needed - will re-prescribe.

## 2020-04-02 NOTE — HISTORY OF PRESENT ILLNESS
[FreeTextEntry1] : follow up [de-identified] : Pt is working on diet again to bring weight down and get into better shape for sake of possible hip replacement surgery.  he is intent on this and has been working hard at diet.  has followed with Dr. Velasco in the interim as well - creatinine which had been remarkably consistent at 2.2 seemed to take a turn upwards towards 2.4.  is getting towards finished with mult procedures on teeth.  has still been keeping to lower carb diet, limiting bread.

## 2020-04-02 NOTE — PHYSICAL EXAM
[No Acute Distress] : no acute distress [Well Nourished] : well nourished [Well Developed] : well developed [Well-Appearing] : well-appearing [Normal Sclera/Conjunctiva] : normal sclera/conjunctiva [PERRL] : pupils equal round and reactive to light [EOMI] : extraocular movements intact [Normal Outer Ear/Nose] : the outer ears and nose were normal in appearance [Normal Oropharynx] : the oropharynx was normal [No JVD] : no jugular venous distention [No Lymphadenopathy] : no lymphadenopathy [Supple] : supple [Thyroid Normal, No Nodules] : the thyroid was normal and there were no nodules present [No Respiratory Distress] : no respiratory distress  [No Accessory Muscle Use] : no accessory muscle use [Clear to Auscultation] : lungs were clear to auscultation bilaterally [Normal Rate] : normal rate  [Regular Rhythm] : with a regular rhythm [Normal S1, S2] : normal S1 and S2 [No Murmur] : no murmur heard [No Carotid Bruits] : no carotid bruits [No Abdominal Bruit] : a ~M bruit was not heard ~T in the abdomen [No Varicosities] : no varicosities [Pedal Pulses Present] : the pedal pulses are present [No Edema] : there was no peripheral edema [No Palpable Aorta] : no palpable aorta [No Extremity Clubbing/Cyanosis] : no extremity clubbing/cyanosis [Soft] : abdomen soft [Non Tender] : non-tender [Non-distended] : non-distended [No Masses] : no abdominal mass palpated [No HSM] : no HSM [Normal Bowel Sounds] : normal bowel sounds [Normal Posterior Cervical Nodes] : no posterior cervical lymphadenopathy [Normal Anterior Cervical Nodes] : no anterior cervical lymphadenopathy [No CVA Tenderness] : no CVA  tenderness [No Spinal Tenderness] : no spinal tenderness [No Joint Swelling] : no joint swelling [Grossly Normal Strength/Tone] : grossly normal strength/tone [No Rash] : no rash [Coordination Grossly Intact] : coordination grossly intact [No Focal Deficits] : no focal deficits [Deep Tendon Reflexes (DTR)] : deep tendon reflexes were 2+ and symmetric [Normal Affect] : the affect was normal [Normal Insight/Judgement] : insight and judgment were intact [Comprehensive Foot Exam Normal] : Right and left foot were examined and both feet are normal. No ulcers in either foot. Toes are normal and with full ROM.  Normal tactile sensation with monofilament testing throughout both feet [de-identified] : overweight [de-identified] : tenderness r hip [de-identified] : antalgic gait

## 2020-04-05 ENCOUNTER — RX RENEWAL (OUTPATIENT)
Age: 70
End: 2020-04-05

## 2020-05-05 ENCOUNTER — RX RENEWAL (OUTPATIENT)
Age: 70
End: 2020-05-05

## 2020-05-06 ENCOUNTER — RX RENEWAL (OUTPATIENT)
Age: 70
End: 2020-05-06

## 2020-06-04 ENCOUNTER — RX RENEWAL (OUTPATIENT)
Age: 70
End: 2020-06-04

## 2020-06-08 ENCOUNTER — RX RENEWAL (OUTPATIENT)
Age: 70
End: 2020-06-08

## 2020-06-08 RX ORDER — CHOLECALCIFEROL (VITAMIN D3) 1250 MCG
1.25 MG CAPSULE ORAL
Qty: 4 | Refills: 0 | Status: ACTIVE | COMMUNITY
Start: 2018-05-24 | End: 1900-01-01

## 2020-06-17 ENCOUNTER — APPOINTMENT (OUTPATIENT)
Dept: NEPHROLOGY | Facility: CLINIC | Age: 70
End: 2020-06-17
Payer: MEDICARE

## 2020-06-17 VITALS — WEIGHT: 302 LBS | HEART RATE: 93 BPM | BODY MASS INDEX: 47.3 KG/M2 | OXYGEN SATURATION: 96 %

## 2020-06-17 VITALS — SYSTOLIC BLOOD PRESSURE: 142 MMHG | DIASTOLIC BLOOD PRESSURE: 78 MMHG

## 2020-06-17 LAB
25(OH)D3 SERPL-MCNC: 61 NG/ML
ALBUMIN SERPL ELPH-MCNC: 3.7 G/DL
ANION GAP SERPL CALC-SCNC: 13 MMOL/L
APPEARANCE: CLEAR
BACTERIA: NEGATIVE
BASOPHILS # BLD AUTO: 0.05 K/UL
BASOPHILS NFR BLD AUTO: 0.6 %
BILIRUBIN URINE: NEGATIVE
BLOOD URINE: ABNORMAL
BUN SERPL-MCNC: 48 MG/DL
CALCIUM SERPL-MCNC: 9.4 MG/DL
CALCIUM SERPL-MCNC: 9.4 MG/DL
CHLORIDE SERPL-SCNC: 104 MMOL/L
CHOLEST SERPL-MCNC: 138 MG/DL
CHOLEST/HDLC SERPL: 3.2 RATIO
CO2 SERPL-SCNC: 26 MMOL/L
COLOR: NORMAL
CREAT SERPL-MCNC: 3.3 MG/DL
EOSINOPHIL # BLD AUTO: 0.42 K/UL
EOSINOPHIL NFR BLD AUTO: 4.8 %
FERRITIN SERPL-MCNC: 152 NG/ML
GLUCOSE QUALITATIVE U: ABNORMAL
GLUCOSE SERPL-MCNC: 144 MG/DL
HCT VFR BLD CALC: 49.8 %
HDLC SERPL-MCNC: 43 MG/DL
HGB BLD-MCNC: 15.6 G/DL
HYALINE CASTS: 2 /LPF
IMM GRANULOCYTES NFR BLD AUTO: 0.3 %
IRON SATN MFR SERPL: 26 %
IRON SERPL-MCNC: 68 UG/DL
KETONES URINE: NEGATIVE
LDLC SERPL CALC-MCNC: 61 MG/DL
LEUKOCYTE ESTERASE URINE: ABNORMAL
LYMPHOCYTES # BLD AUTO: 2.03 K/UL
LYMPHOCYTES NFR BLD AUTO: 23.1 %
MAN DIFF?: NORMAL
MCHC RBC-ENTMCNC: 30.6 PG
MCHC RBC-ENTMCNC: 31.3 GM/DL
MCV RBC AUTO: 97.8 FL
MICROSCOPIC-UA: NORMAL
MONOCYTES # BLD AUTO: 0.79 K/UL
MONOCYTES NFR BLD AUTO: 9 %
NEUTROPHILS # BLD AUTO: 5.46 K/UL
NEUTROPHILS NFR BLD AUTO: 62.2 %
NITRITE URINE: NEGATIVE
PARATHYROID HORMONE INTACT: 110 PG/ML
PH URINE: 6.5
PHOSPHATE SERPL-MCNC: 3.8 MG/DL
PLATELET # BLD AUTO: 206 K/UL
POTASSIUM SERPL-SCNC: 4.7 MMOL/L
PROTEIN URINE: ABNORMAL
RBC # BLD: 5.09 M/UL
RBC # FLD: 13.7 %
RED BLOOD CELLS URINE: 4 /HPF
SODIUM SERPL-SCNC: 143 MMOL/L
SPECIFIC GRAVITY URINE: 1.02
SQUAMOUS EPITHELIAL CELLS: 4 /HPF
TIBC SERPL-MCNC: 256 UG/DL
TRIGL SERPL-MCNC: 172 MG/DL
UIBC SERPL-MCNC: 189 UG/DL
UROBILINOGEN URINE: NORMAL
WBC # FLD AUTO: 8.78 K/UL
WHITE BLOOD CELLS URINE: 34 /HPF

## 2020-06-17 PROCEDURE — 99214 OFFICE O/P EST MOD 30 MIN: CPT

## 2020-06-18 LAB
CREAT SPEC-SCNC: 78 MG/DL
CREAT/PROT UR: 10 RATIO
PROT UR-MCNC: 778 MG/DL

## 2020-06-18 NOTE — REVIEW OF SYSTEMS
[Difficulty Walking] : difficulty walking [Negative] : Heme/Lymph [Lower Ext Edema] : lower extremity edema [Fever] : no fever [Feeling Poorly] : not feeling poorly [Chills] : no chills [Recent Weight Gain (___ Lbs)] : no recent weight gain [Recent Weight Loss (___ Lbs)] : no recent weight loss [Feeling Tired] : not feeling tired [Eyesight Problems] : no eyesight problems [Chest Pain] : no chest pain [Loss Of Hearing] : no hearing loss [Sore Throat] : no sore throat [Palpitations] : no palpitations [Leg Claudication] : no intermittent leg claudication [SOB on Exertion] : no shortness of breath during exertion [Cough] : no cough [Shortness Of Breath] : no shortness of breath [Abdominal Pain] : no abdominal pain [Orthopnea] : no orthopnea [Vomiting] : no vomiting [Diarrhea] : no diarrhea [Joint Swelling] : no joint swelling [Dysuria] : no dysuria [Hesitancy] : no urinary hesitancy [Limb Swelling] : no limb swelling [Dizziness] : no dizziness [Limb Weakness] : no limb weakness [Fainting] : no fainting [FreeTextEntry9] :  Hip pain.  [de-identified] : Due to hip pain.

## 2020-06-18 NOTE — PHYSICAL EXAM
[General Appearance - Alert] : alert [General Appearance - In No Acute Distress] : in no acute distress [General Appearance - Well Developed] : well developed [Sclera] : the sclera and conjunctiva were normal [PERRL With Normal Accommodation] : pupils were equal in size, round, and reactive to light [Oropharynx] : the oropharynx was normal [Neck Appearance] : the appearance of the neck was normal [Jugular Venous Distention Increased] : there was no jugular-venous distention [Thyroid Diffuse Enlargement] : the thyroid was not enlarged [Auscultation Breath Sounds / Voice Sounds] : lungs were clear to auscultation bilaterally [Heart Rate And Rhythm] : heart rate was normal and rhythm regular [Heart Sounds] : normal S1 and S2 [Heart Sounds Gallop] : no gallops [Murmurs] : no murmurs [Heart Sounds Pericardial Friction Rub] : no pericardial rub [Bowel Sounds] : normal bowel sounds [Abdomen Soft] : soft [Abdomen Tenderness] : non-tender [Abdomen Mass (___ Cm)] : no abdominal mass palpated [Abdomen Hernia] : no hernia was discovered [Abnormal Walk] : normal gait [Musculoskeletal - Swelling] : no joint swelling seen [] : no rash [No Focal Deficits] : no focal deficits [Oriented To Time, Place, And Person] : oriented to person, place, and time [FreeTextEntry1] : using cane

## 2020-06-18 NOTE — ASSESSMENT
[FreeTextEntry1] : 69 y.o M with PMHx mentioned above, following for CKD stage 4 with proteinuria,  secondary to HTN and DM and obesity( sec FSGS) , stable.\par \par CKD stage 4. \par -Creatinine elevated to 3.3 now in the setting of increased salt intake, worsening LE swelling. proteinuria appears stable on repeat labs today\par -Has proteinuria, on losartan, currently 10 grams/gram\par -On Bicarbonate tablets due to metabolic acidosis\par \par Hyperkalemia:\par - On Kayexalate\par - Continue to monitor K, currently 4.8\par \par Hypertension:\par -well controlled, continue current regimen. \par \par Mineral Bone disorder. \par - PTH and Vitamin normal. A1c 7.2 %\par \par \par RTC in 3-4 months. \par \par Patient seen and discussed with Dr. Velasco who agrees with assessment and plan as above.

## 2020-06-18 NOTE — END OF VISIT
[] : Fellow [FreeTextEntry3] : low salt diet reinforced.\par unfortunately he is getting worsening renal failure and proteinuria( nephrotic range), he is on ARB and had hyperkalemia on increased dose,.

## 2020-06-18 NOTE — HISTORY OF PRESENT ILLNESS
[FreeTextEntry1] : Pt presented to clinic for routine visit. Patient states he has been compliant with medications however states he has increased salt intake with salted butter and crackers due to inability to chew more solid food as he needs dental work done. He also notes that he has had worsening swelling of his LE. No other major complaints noted.

## 2020-06-23 ENCOUNTER — RX RENEWAL (OUTPATIENT)
Age: 70
End: 2020-06-23

## 2020-07-02 ENCOUNTER — RX RENEWAL (OUTPATIENT)
Age: 70
End: 2020-07-02

## 2020-07-10 ENCOUNTER — RX RENEWAL (OUTPATIENT)
Age: 70
End: 2020-07-10

## 2020-07-14 ENCOUNTER — APPOINTMENT (OUTPATIENT)
Dept: INTERNAL MEDICINE | Facility: CLINIC | Age: 70
End: 2020-07-14

## 2020-07-31 ENCOUNTER — RX RENEWAL (OUTPATIENT)
Age: 70
End: 2020-07-31

## 2020-08-06 ENCOUNTER — RX RENEWAL (OUTPATIENT)
Age: 70
End: 2020-08-06

## 2020-08-10 ENCOUNTER — RX RENEWAL (OUTPATIENT)
Age: 70
End: 2020-08-10

## 2020-08-13 RX ORDER — BLOOD-GLUCOSE METER
W/DEVICE KIT MISCELLANEOUS
Qty: 1 | Refills: 0 | Status: ACTIVE | COMMUNITY
Start: 2020-08-13 | End: 1900-01-01

## 2020-08-14 RX ORDER — LANCETS 33 GAUGE
EACH MISCELLANEOUS
Qty: 1 | Refills: 3 | Status: ACTIVE | COMMUNITY
Start: 2020-08-14 | End: 1900-01-01

## 2020-08-14 RX ORDER — BLOOD SUGAR DIAGNOSTIC
STRIP MISCELLANEOUS
Qty: 1 | Refills: 3 | Status: ACTIVE | COMMUNITY
Start: 2020-08-14 | End: 1900-01-01

## 2020-08-27 ENCOUNTER — APPOINTMENT (OUTPATIENT)
Dept: INTERNAL MEDICINE | Facility: CLINIC | Age: 70
End: 2020-08-27
Payer: MEDICARE

## 2020-08-27 ENCOUNTER — RX RENEWAL (OUTPATIENT)
Age: 70
End: 2020-08-27

## 2020-08-27 VITALS
HEIGHT: 67 IN | BODY MASS INDEX: 47.09 KG/M2 | HEART RATE: 104 BPM | DIASTOLIC BLOOD PRESSURE: 100 MMHG | SYSTOLIC BLOOD PRESSURE: 170 MMHG | WEIGHT: 300 LBS | OXYGEN SATURATION: 99 %

## 2020-08-27 DIAGNOSIS — K27.9 PEPTIC ULCER, SITE UNSPECIFIED, UNSPECIFIED AS ACUTE OR CHRONIC, W/OUT HEMORRHAGE OR PERFORATION: ICD-10-CM

## 2020-08-27 DIAGNOSIS — R10.11 RIGHT UPPER QUADRANT PAIN: ICD-10-CM

## 2020-08-27 PROCEDURE — 99214 OFFICE O/P EST MOD 30 MIN: CPT

## 2020-08-27 RX ORDER — PENICILLIN V POTASSIUM 500 MG/1
500 TABLET, FILM COATED ORAL EVERY 6 HOURS
Qty: 40 | Refills: 0 | Status: ACTIVE | COMMUNITY
Start: 2020-03-10 | End: 1900-01-01

## 2020-08-28 LAB
ALBUMIN SERPL ELPH-MCNC: 3.8 G/DL
ALP BLD-CCNC: 63 U/L
ALT SERPL-CCNC: 25 U/L
ANION GAP SERPL CALC-SCNC: 12 MMOL/L
AST SERPL-CCNC: 27 U/L
BASOPHILS # BLD AUTO: 0.06 K/UL
BASOPHILS NFR BLD AUTO: 0.7 %
BILIRUB SERPL-MCNC: 0.5 MG/DL
BUN SERPL-MCNC: 44 MG/DL
CALCIUM SERPL-MCNC: 9.8 MG/DL
CHLORIDE SERPL-SCNC: 104 MMOL/L
CHOLEST SERPL-MCNC: 160 MG/DL
CHOLEST/HDLC SERPL: 4.3 RATIO
CO2 SERPL-SCNC: 26 MMOL/L
CREAT SERPL-MCNC: 2.85 MG/DL
EOSINOPHIL # BLD AUTO: 0.29 K/UL
EOSINOPHIL NFR BLD AUTO: 3.5 %
ESTIMATED AVERAGE GLUCOSE: 123 MG/DL
GLUCOSE SERPL-MCNC: 118 MG/DL
HBA1C MFR BLD HPLC: 5.9 %
HCT VFR BLD CALC: 43.4 %
HDLC SERPL-MCNC: 38 MG/DL
HGB BLD-MCNC: 13.5 G/DL
IMM GRANULOCYTES NFR BLD AUTO: 0.4 %
LDLC SERPL CALC-MCNC: 87 MG/DL
LYMPHOCYTES # BLD AUTO: 1.73 K/UL
LYMPHOCYTES NFR BLD AUTO: 21.1 %
MAN DIFF?: NORMAL
MCHC RBC-ENTMCNC: 31.1 GM/DL
MCHC RBC-ENTMCNC: 31.4 PG
MCV RBC AUTO: 100.9 FL
MONOCYTES # BLD AUTO: 0.77 K/UL
MONOCYTES NFR BLD AUTO: 9.4 %
NEUTROPHILS # BLD AUTO: 5.32 K/UL
NEUTROPHILS NFR BLD AUTO: 64.9 %
PLATELET # BLD AUTO: 262 K/UL
POTASSIUM SERPL-SCNC: 5.6 MMOL/L
PROT SERPL-MCNC: 6.5 G/DL
RBC # BLD: 4.3 M/UL
RBC # FLD: 14.6 %
SODIUM SERPL-SCNC: 142 MMOL/L
TRIGL SERPL-MCNC: 181 MG/DL
WBC # FLD AUTO: 8.2 K/UL

## 2020-08-30 NOTE — PLAN
[FreeTextEntry1] : Pt with likely recent peptic ulcer - seems most likely gastric - with hx for likely bleeding ulcer and then some degree of remission. Pt is at substantial risk for this given comorbidities. has had no adverse coronary sx but will be important to urgently presumptively tx and work this up.  As pain has been epigastric but also right-sided and he additionally has cholelithiasis risks, would also check ruq us.  will tx with bid pantoprazole for now; can reduce to qd as sx ideally remit over next ?2-4 weeks. will follow to monitor and adjust tx.  gi referral for endoscopy.  checking bloods today.  has not been using new nsaid's nor any new meds but is on multiple for kidney dz.  neuropathy and hip-related pains still notable - walking with cane, with difficulty. ultimately thr will be imperative but first pud v. aleja w/u and tx.

## 2020-08-30 NOTE — HISTORY OF PRESENT ILLNESS
[de-identified] : Pt to office today with recent abdominal pain and dizziness.  had some difficulty in recent past weeks getting up from seated position - would feel somewhat dizzy at those times. no fever, no chest pain, no sob.  has abated somewhat last few days.  however also in last two weeks or so developed epigastric and at times ruq pain.  pain mitigated by taking food, worst in AM before eating.  found self  taking more dairy as well - remembered this from when father had ulcer, ultimately tx'ed surgically, years ago.  he has no known hx gb stones and food did seem to mitigate issue rather than exacerbate.  describes blackish bm's with greater frequency at the beginning of this period; now still with some greater frequency than prior, but stool nl brown color.   [FreeTextEntry1] : abdominal pain

## 2020-08-30 NOTE — PHYSICAL EXAM
[No Acute Distress] : no acute distress [Well Nourished] : well nourished [Well Developed] : well developed [Well-Appearing] : well-appearing [PERRL] : pupils equal round and reactive to light [Normal Sclera/Conjunctiva] : normal sclera/conjunctiva [EOMI] : extraocular movements intact [Normal Outer Ear/Nose] : the outer ears and nose were normal in appearance [Normal Oropharynx] : the oropharynx was normal [No JVD] : no jugular venous distention [No Lymphadenopathy] : no lymphadenopathy [Supple] : supple [Thyroid Normal, No Nodules] : the thyroid was normal and there were no nodules present [No Accessory Muscle Use] : no accessory muscle use [No Respiratory Distress] : no respiratory distress  [Clear to Auscultation] : lungs were clear to auscultation bilaterally [Regular Rhythm] : with a regular rhythm [Normal Rate] : normal rate  [Normal S1, S2] : normal S1 and S2 [No Carotid Bruits] : no carotid bruits [No Murmur] : no murmur heard [No Abdominal Bruit] : a ~M bruit was not heard ~T in the abdomen [No Varicosities] : no varicosities [Pedal Pulses Present] : the pedal pulses are present [No Edema] : there was no peripheral edema [No Palpable Aorta] : no palpable aorta [No Extremity Clubbing/Cyanosis] : no extremity clubbing/cyanosis [Soft] : abdomen soft [Non Tender] : non-tender [Non-distended] : non-distended [No Masses] : no abdominal mass palpated [No HSM] : no HSM [Normal Bowel Sounds] : normal bowel sounds [Declined Rectal Exam] : declined rectal exam [Normal Posterior Cervical Nodes] : no posterior cervical lymphadenopathy [Normal Anterior Cervical Nodes] : no anterior cervical lymphadenopathy [No CVA Tenderness] : no CVA  tenderness [No Spinal Tenderness] : no spinal tenderness [No Joint Swelling] : no joint swelling [No Rash] : no rash [Grossly Normal Strength/Tone] : grossly normal strength/tone [Coordination Grossly Intact] : coordination grossly intact [No Focal Deficits] : no focal deficits [Deep Tendon Reflexes (DTR)] : deep tendon reflexes were 2+ and symmetric [Normal Affect] : the affect was normal [Comprehensive Foot Exam Normal] : Right and left foot were examined and both feet are normal. No ulcers in either foot. Toes are normal and with full ROM.  Normal tactile sensation with monofilament testing throughout both feet [Normal Insight/Judgement] : insight and judgment were intact [de-identified] : overweight [de-identified] : tenderness r hip [de-identified] : antalgic gait

## 2020-09-09 ENCOUNTER — RX RENEWAL (OUTPATIENT)
Age: 70
End: 2020-09-09

## 2020-09-14 ENCOUNTER — RX RENEWAL (OUTPATIENT)
Age: 70
End: 2020-09-14

## 2020-09-16 ENCOUNTER — APPOINTMENT (OUTPATIENT)
Dept: NEPHROLOGY | Facility: CLINIC | Age: 70
End: 2020-09-16
Payer: MEDICARE

## 2020-09-16 VITALS
WEIGHT: 305.34 LBS | OXYGEN SATURATION: 95 % | DIASTOLIC BLOOD PRESSURE: 97 MMHG | BODY MASS INDEX: 47.82 KG/M2 | SYSTOLIC BLOOD PRESSURE: 166 MMHG | HEART RATE: 84 BPM

## 2020-09-16 VITALS — SYSTOLIC BLOOD PRESSURE: 140 MMHG | DIASTOLIC BLOOD PRESSURE: 80 MMHG

## 2020-09-16 DIAGNOSIS — Z23 ENCOUNTER FOR IMMUNIZATION: ICD-10-CM

## 2020-09-16 PROCEDURE — 99214 OFFICE O/P EST MOD 30 MIN: CPT | Mod: 25

## 2020-09-16 PROCEDURE — 90686 IIV4 VACC NO PRSV 0.5 ML IM: CPT

## 2020-09-16 PROCEDURE — G0008: CPT

## 2020-09-16 RX ORDER — SODIUM POLYSTYRENE SULFONATE 4.1 MEQ/G
POWDER, FOR SUSPENSION ORAL; RECTAL
Qty: 454 | Refills: 3 | Status: DISCONTINUED | COMMUNITY
Start: 2019-03-14 | End: 2020-09-16

## 2020-09-16 NOTE — REVIEW OF SYSTEMS
[Fever] : no fever [Feeling Poorly] : not feeling poorly [Chills] : no chills [Feeling Tired] : not feeling tired [Recent Weight Loss (___ Lbs)] : no recent weight loss [Recent Weight Gain (___ Lbs)] : no recent weight gain [Eyesight Problems] : no eyesight problems [Loss Of Hearing] : no hearing loss [Sore Throat] : no sore throat [Leg Claudication] : no intermittent leg claudication [Chest Pain] : no chest pain [Palpitations] : no palpitations [Lower Ext Edema] : lower extremity edema [Shortness Of Breath] : no shortness of breath [Cough] : no cough [Vomiting] : no vomiting [SOB on Exertion] : no shortness of breath during exertion [Abdominal Pain] : no abdominal pain [Orthopnea] : no orthopnea [Dysuria] : no dysuria [Diarrhea] : no diarrhea [Joint Swelling] : no joint swelling [Hesitancy] : no urinary hesitancy [Dizziness] : no dizziness [Limb Swelling] : no limb swelling [Fainting] : no fainting [Limb Weakness] : no limb weakness [Difficulty Walking] : difficulty walking [Negative] : Endocrine [FreeTextEntry9] :  Hip pain.  [de-identified] : Due to hip pain.

## 2020-09-16 NOTE — PHYSICAL EXAM
[General Appearance - Alert] : alert [General Appearance - Well Developed] : well developed [General Appearance - In No Acute Distress] : in no acute distress [Sclera] : the sclera and conjunctiva were normal [PERRL With Normal Accommodation] : pupils were equal in size, round, and reactive to light [Oropharynx] : the oropharynx was normal [Neck Appearance] : the appearance of the neck was normal [Jugular Venous Distention Increased] : there was no jugular-venous distention [Thyroid Diffuse Enlargement] : the thyroid was not enlarged [Auscultation Breath Sounds / Voice Sounds] : lungs were clear to auscultation bilaterally [Heart Sounds] : normal S1 and S2 [Heart Rate And Rhythm] : heart rate was normal and rhythm regular [Heart Sounds Gallop] : no gallops [Murmurs] : no murmurs [Heart Sounds Pericardial Friction Rub] : no pericardial rub [Abdomen Soft] : soft [Bowel Sounds] : normal bowel sounds [Abdomen Tenderness] : non-tender [Musculoskeletal - Swelling] : no joint swelling seen [Abnormal Walk] : normal gait [FreeTextEntry1] : using cane  [] : no rash [No Focal Deficits] : no focal deficits [Oriented To Time, Place, And Person] : oriented to person, place, and time

## 2020-09-16 NOTE — HISTORY OF PRESENT ILLNESS
[FreeTextEntry1] : has been eating less salty food  and less butter \par his potassium is mildly high. on Kexylate \par he is off simvastatin for 1 month but not sure why?

## 2020-09-16 NOTE — ASSESSMENT
[FreeTextEntry1] : 69 y.o M with PMHx mentioned above, following for CKD stage 4 with proteinuria, secondary to HTN and DM and obesity( sec FSGS) \par \par CKD stage 4. \par -Creatinine elevated  3 range  now \par -continue with low salt diet \par - proteinuria has been high despite ARB... limited by hyperkalemia \par -On Bicarbonate tablets due to metabolic acidosis\par \par Hyperkalemia:\par - On Kayexalate... will try Lokelma if insurance allows . \par - Continue to monitor K, currently 5.6\par \par Hypertension:\par -acceptable, continue current regimen. \par \par Mineral Bone disorder. \par - PTH mild elevation as expected in late CKD stage ; 110 and Vitamin normal 61.\par  A1c 5.9 %\par \par Flu vaccine given today \par -I reached out to Dr. Mcdermott if can take the simvastatin again \par RTC in 3-4 months. \par

## 2020-09-30 ENCOUNTER — RX RENEWAL (OUTPATIENT)
Age: 70
End: 2020-09-30

## 2020-11-29 NOTE — PHYSICAL EXAM
[General Appearance - Well Developed] : well developed [General Appearance - Well Nourished] : well nourished [Normal Appearance] : normal appearance [Well Groomed] : well groomed [General Appearance - In No Acute Distress] : no acute distress [Abdomen Soft] : soft [Abdomen Tenderness] : non-tender [Costovertebral Angle Tenderness] : no ~M costovertebral angle tenderness [Skin Color & Pigmentation] : normal skin color and pigmentation [Heart Rate And Rhythm] : Heart rate and rhythm were normal [] : no respiratory distress [Respiration, Rhythm And Depth] : normal respiratory rhythm and effort [Oriented To Time, Place, And Person] : oriented to person, place, and time [Affect] : the affect was normal [Mood] : the mood was normal [Not Anxious] : not anxious [FreeTextEntry1] : using cane, hip pain

## 2020-11-29 NOTE — HISTORY OF PRESENT ILLNESS
[FreeTextEntry1] : 71 y/o man hx of kidney stones, CKD\par Here for f/u\par \par Renal sono: two nonobstructing stones in the lower pole of the left kidney measuring 3.9 mm and 4.3 mm.\par \par 24 hr urine: not ordered\par \par PSA 5/2019: 0.17\par Cr 3.1 (Oct 2020)\par \par Patient is currently experiencing no symptoms. \par Pain Level: None  [Urinary Incontinence] : no urinary incontinence [Urinary Retention] : no urinary retention [Urinary Urgency] : no urinary urgency

## 2020-12-17 NOTE — REASON FOR VISIT
[Home] : at home, [unfilled] , at the time of the visit. [Follow-Up] : a follow-up visit [Medical Office: ([facility])___] : at the medical office located in  [Verbal consent obtained from patient] : the patient, [unfilled]

## 2020-12-17 NOTE — REVIEW OF SYSTEMS
[Fever] : no fever [Chills] : no chills [Feeling Poorly] : not feeling poorly [Feeling Tired] : not feeling tired [Recent Weight Gain (___ Lbs)] : no recent weight gain [Recent Weight Loss (___ Lbs)] : no recent weight loss [Eyesight Problems] : no eyesight problems [Loss Of Hearing] : no hearing loss [Sore Throat] : no sore throat [Chest Pain] : no chest pain [Palpitations] : no palpitations [Leg Claudication] : no intermittent leg claudication [Lower Ext Edema] : lower extremity edema [Shortness Of Breath] : no shortness of breath [Cough] : no cough [SOB on Exertion] : no shortness of breath during exertion [Orthopnea] : no orthopnea [Abdominal Pain] : no abdominal pain [Vomiting] : no vomiting [Diarrhea] : no diarrhea [Dysuria] : no dysuria [Hesitancy] : no urinary hesitancy [Joint Swelling] : no joint swelling [Limb Swelling] : no limb swelling [Dizziness] : no dizziness [Fainting] : no fainting [Limb Weakness] : no limb weakness [Difficulty Walking] : difficulty walking [Negative] : Heme/Lymph [FreeTextEntry9] :  Hip pain.  [de-identified] : Due to hip pain.

## 2020-12-17 NOTE — HISTORY OF PRESENT ILLNESS
[FreeTextEntry1] : \par CKD 4, DM and kidney stones \par following low salt and potassium diet\par minimal edema \par cr and proteinuria  is getting worse

## 2020-12-17 NOTE — ASSESSMENT
[FreeTextEntry1] : \par 69 y.o M with PMHx mentioned above, following for CKD stage 4 with proteinuria, secondary to HTN and DM and obesity( sec FSGS) and kidney stones \par \par CKD stage 4. \par -Creatinine elevated 3 range now \par -continue with low salt diet \par - proteinuria has been high despite ARB... limited by hyperkalemia \par -On Bicarbonate tablets due to metabolic acidosis\par -repeat Blood work this week. will arrange \par \par Hyperkalemia:\par - On  Lokelma now \par - Continue to monitor K, \par \par Hypertension:\par -acceptable, continue current regimen. \par \par Mineral Bone disorder. \par - PTH mild elevation as expected in late CKD stage ; 110 and Vitamin normal 61.\par  A1c 5.9 %. repeat pending \par \par -I reached out to  if we can start SGLT2 for kidney progression prevention.\par - will enroll for HT and start preparing for dialysis and transplant ( his BMI 47.35 and is not a candidate)   \par RTC in 3-4 months. \par

## 2021-01-01 ENCOUNTER — NON-APPOINTMENT (OUTPATIENT)
Age: 71
End: 2021-01-01

## 2021-01-01 ENCOUNTER — RX RENEWAL (OUTPATIENT)
Age: 71
End: 2021-01-01

## 2021-01-01 ENCOUNTER — OUTPATIENT (OUTPATIENT)
Dept: OUTPATIENT SERVICES | Facility: HOSPITAL | Age: 71
LOS: 1 days | End: 2021-01-01
Payer: MEDICARE

## 2021-01-01 ENCOUNTER — APPOINTMENT (OUTPATIENT)
Dept: NEPHROLOGY | Facility: CLINIC | Age: 71
End: 2021-01-01
Payer: MEDICARE

## 2021-01-01 ENCOUNTER — APPOINTMENT (OUTPATIENT)
Dept: CARDIOLOGY | Facility: CLINIC | Age: 71
End: 2021-01-01
Payer: MEDICARE

## 2021-01-01 ENCOUNTER — APPOINTMENT (OUTPATIENT)
Dept: INTERNAL MEDICINE | Facility: CLINIC | Age: 71
End: 2021-01-01
Payer: MEDICARE

## 2021-01-01 ENCOUNTER — APPOINTMENT (OUTPATIENT)
Dept: ORTHOPEDIC SURGERY | Facility: CLINIC | Age: 71
End: 2021-01-01
Payer: MEDICARE

## 2021-01-01 ENCOUNTER — APPOINTMENT (OUTPATIENT)
Dept: DISASTER EMERGENCY | Facility: CLINIC | Age: 71
End: 2021-01-01

## 2021-01-01 VITALS — HEART RATE: 70 BPM | DIASTOLIC BLOOD PRESSURE: 76 MMHG | SYSTOLIC BLOOD PRESSURE: 148 MMHG | OXYGEN SATURATION: 95 %

## 2021-01-01 VITALS — HEART RATE: 84 BPM | OXYGEN SATURATION: 95 % | WEIGHT: 290 LBS | BODY MASS INDEX: 45.42 KG/M2 | TEMPERATURE: 97.3 F

## 2021-01-01 VITALS
HEIGHT: 67 IN | BODY MASS INDEX: 45.52 KG/M2 | OXYGEN SATURATION: 95 % | SYSTOLIC BLOOD PRESSURE: 124 MMHG | HEART RATE: 82 BPM | DIASTOLIC BLOOD PRESSURE: 70 MMHG | WEIGHT: 290 LBS

## 2021-01-01 VITALS — SYSTOLIC BLOOD PRESSURE: 130 MMHG | DIASTOLIC BLOOD PRESSURE: 70 MMHG

## 2021-01-01 VITALS
HEART RATE: 83 BPM | OXYGEN SATURATION: 91 % | HEIGHT: 67 IN | DIASTOLIC BLOOD PRESSURE: 80 MMHG | SYSTOLIC BLOOD PRESSURE: 132 MMHG

## 2021-01-01 VITALS
RESPIRATION RATE: 18 BRPM | TEMPERATURE: 98 F | OXYGEN SATURATION: 97 % | DIASTOLIC BLOOD PRESSURE: 99 MMHG | HEIGHT: 67 IN | WEIGHT: 300.05 LBS | HEART RATE: 72 BPM | SYSTOLIC BLOOD PRESSURE: 130 MMHG

## 2021-01-01 VITALS
SYSTOLIC BLOOD PRESSURE: 156 MMHG | BODY MASS INDEX: 48.08 KG/M2 | DIASTOLIC BLOOD PRESSURE: 84 MMHG | WEIGHT: 307 LBS | OXYGEN SATURATION: 93 % | HEART RATE: 97 BPM

## 2021-01-01 VITALS — HEART RATE: 78 BPM | DIASTOLIC BLOOD PRESSURE: 64 MMHG | SYSTOLIC BLOOD PRESSURE: 137 MMHG | OXYGEN SATURATION: 90 %

## 2021-01-01 DIAGNOSIS — R94.39 ABNORMAL RESULT OF OTHER CARDIOVASCULAR FUNCTION STUDY: ICD-10-CM

## 2021-01-01 DIAGNOSIS — G62.9 POLYNEUROPATHY, UNSPECIFIED: ICD-10-CM

## 2021-01-01 DIAGNOSIS — B35.4 TINEA CORPORIS: ICD-10-CM

## 2021-01-01 DIAGNOSIS — R94.31 ABNORMAL ELECTROCARDIOGRAM [ECG] [EKG]: ICD-10-CM

## 2021-01-01 DIAGNOSIS — M25.559 PAIN IN UNSPECIFIED HIP: ICD-10-CM

## 2021-01-01 DIAGNOSIS — E11.9 TYPE 2 DIABETES MELLITUS W/OUT COMPLICATIONS: ICD-10-CM

## 2021-01-01 DIAGNOSIS — E66.9 OBESITY, UNSPECIFIED: ICD-10-CM

## 2021-01-01 DIAGNOSIS — E11.42 TYPE 2 DIABETES MELLITUS WITH DIABETIC POLYNEUROPATHY: ICD-10-CM

## 2021-01-01 DIAGNOSIS — Z87.891 PERSONAL HISTORY OF NICOTINE DEPENDENCE: ICD-10-CM

## 2021-01-01 DIAGNOSIS — N18.4 CHRONIC KIDNEY DISEASE, STAGE 4 (SEVERE): ICD-10-CM

## 2021-01-01 DIAGNOSIS — R60.0 LOCALIZED EDEMA: ICD-10-CM

## 2021-01-01 DIAGNOSIS — Z01.818 ENCOUNTER FOR OTHER PREPROCEDURAL EXAMINATION: ICD-10-CM

## 2021-01-01 DIAGNOSIS — E78.00 PURE HYPERCHOLESTEROLEMIA, UNSPECIFIED: ICD-10-CM

## 2021-01-01 DIAGNOSIS — M16.11 UNILATERAL PRIMARY OSTEOARTHRITIS, RIGHT HIP: ICD-10-CM

## 2021-01-01 DIAGNOSIS — I44.2 ATRIOVENTRICULAR BLOCK, COMPLETE: ICD-10-CM

## 2021-01-01 DIAGNOSIS — I10 ESSENTIAL (PRIMARY) HYPERTENSION: ICD-10-CM

## 2021-01-01 LAB
ANION GAP SERPL CALC-SCNC: 14 MMOL/L — SIGNIFICANT CHANGE UP (ref 5–17)
BUN SERPL-MCNC: 51 MG/DL — HIGH (ref 7–23)
CALCIUM SERPL-MCNC: 9.3 MG/DL — SIGNIFICANT CHANGE UP (ref 8.4–10.5)
CHLORIDE SERPL-SCNC: 105 MMOL/L — SIGNIFICANT CHANGE UP (ref 96–108)
CO2 SERPL-SCNC: 22 MMOL/L — SIGNIFICANT CHANGE UP (ref 22–31)
CREAT SERPL-MCNC: 3.09 MG/DL — HIGH (ref 0.5–1.3)
CREAT SPEC-SCNC: 148 MG/DL
CREAT/PROT UR: 8.1 RATIO
GLUCOSE BLDC GLUCOMTR-MCNC: 110 MG/DL — HIGH (ref 70–99)
GLUCOSE SERPL-MCNC: 123 MG/DL — HIGH (ref 70–99)
HCT VFR BLD CALC: 47.1 % — SIGNIFICANT CHANGE UP (ref 39–50)
HGB BLD-MCNC: 14.9 G/DL — SIGNIFICANT CHANGE UP (ref 13–17)
MCHC RBC-ENTMCNC: 30.3 PG — SIGNIFICANT CHANGE UP (ref 27–34)
MCHC RBC-ENTMCNC: 31.6 GM/DL — LOW (ref 32–36)
MCV RBC AUTO: 95.7 FL — SIGNIFICANT CHANGE UP (ref 80–100)
NRBC # BLD: 0 /100 WBCS — SIGNIFICANT CHANGE UP (ref 0–0)
PLATELET # BLD AUTO: 206 K/UL — SIGNIFICANT CHANGE UP (ref 150–400)
POTASSIUM SERPL-MCNC: 5.3 MMOL/L — SIGNIFICANT CHANGE UP (ref 3.5–5.3)
POTASSIUM SERPL-SCNC: 5.3 MMOL/L — SIGNIFICANT CHANGE UP (ref 3.5–5.3)
PROT UR-MCNC: 1204 MG/DL
RBC # BLD: 4.92 M/UL — SIGNIFICANT CHANGE UP (ref 4.2–5.8)
RBC # FLD: 14.6 % — HIGH (ref 10.3–14.5)
SARS-COV-2 N GENE NPH QL NAA+PROBE: NOT DETECTED
SODIUM SERPL-SCNC: 141 MMOL/L — SIGNIFICANT CHANGE UP (ref 135–145)
WBC # BLD: 7.56 K/UL — SIGNIFICANT CHANGE UP (ref 3.8–10.5)
WBC # FLD AUTO: 7.56 K/UL — SIGNIFICANT CHANGE UP (ref 3.8–10.5)

## 2021-01-01 PROCEDURE — 82962 GLUCOSE BLOOD TEST: CPT

## 2021-01-01 PROCEDURE — 78452 HT MUSCLE IMAGE SPECT MULT: CPT

## 2021-01-01 PROCEDURE — 93458 L HRT ARTERY/VENTRICLE ANGIO: CPT | Mod: 26

## 2021-01-01 PROCEDURE — 99152 MOD SED SAME PHYS/QHP 5/>YRS: CPT

## 2021-01-01 PROCEDURE — 93306 TTE W/DOPPLER COMPLETE: CPT

## 2021-01-01 PROCEDURE — 99214 OFFICE O/P EST MOD 30 MIN: CPT

## 2021-01-01 PROCEDURE — 73502 X-RAY EXAM HIP UNI 2-3 VIEWS: CPT | Mod: RT

## 2021-01-01 PROCEDURE — A9500: CPT

## 2021-01-01 PROCEDURE — 99205 OFFICE O/P NEW HI 60 MIN: CPT

## 2021-01-01 PROCEDURE — 99203 OFFICE O/P NEW LOW 30 MIN: CPT

## 2021-01-01 PROCEDURE — C1894: CPT

## 2021-01-01 PROCEDURE — 93015 CV STRESS TEST SUPVJ I&R: CPT

## 2021-01-01 PROCEDURE — C1769: CPT

## 2021-01-01 PROCEDURE — 93000 ELECTROCARDIOGRAM COMPLETE: CPT

## 2021-01-01 PROCEDURE — 99215 OFFICE O/P EST HI 40 MIN: CPT

## 2021-01-01 PROCEDURE — 80048 BASIC METABOLIC PNL TOTAL CA: CPT

## 2021-01-01 PROCEDURE — 93010 ELECTROCARDIOGRAM REPORT: CPT

## 2021-01-01 PROCEDURE — C1887: CPT

## 2021-01-01 PROCEDURE — 93458 L HRT ARTERY/VENTRICLE ANGIO: CPT

## 2021-01-01 PROCEDURE — 85027 COMPLETE CBC AUTOMATED: CPT

## 2021-01-01 PROCEDURE — 93005 ELECTROCARDIOGRAM TRACING: CPT

## 2021-01-01 RX ORDER — GABAPENTIN 400 MG/1
2 CAPSULE ORAL
Qty: 0 | Refills: 0 | DISCHARGE

## 2021-01-01 RX ORDER — DESONIDE 0.5 MG/ML
0.05 LOTION TOPICAL TWICE DAILY
Qty: 1 | Refills: 3 | Status: ACTIVE | COMMUNITY
Start: 2018-05-31 | End: 1900-01-01

## 2021-01-01 RX ORDER — PANTOPRAZOLE 40 MG/1
40 TABLET, DELAYED RELEASE ORAL
Qty: 60 | Refills: 0 | Status: ACTIVE | COMMUNITY
Start: 2020-08-27 | End: 1900-01-01

## 2021-01-01 RX ORDER — CHOLECALCIFEROL (VITAMIN D3) 1250 MCG
1.25 MG CAPSULE ORAL
Qty: 4 | Refills: 4 | Status: ACTIVE | COMMUNITY
Start: 2020-07-02 | End: 1900-01-01

## 2021-01-01 RX ORDER — ECONAZOLE NITRATE 10 MG/G
1 CREAM TOPICAL
Qty: 1 | Refills: 1 | Status: ACTIVE | COMMUNITY
Start: 2021-01-01 | End: 1900-01-01

## 2021-01-01 RX ORDER — SODIUM ZIRCONIUM CYCLOSILICATE 10 G/10G
10 POWDER, FOR SUSPENSION ORAL DAILY
Qty: 90 | Refills: 3 | Status: DISCONTINUED | COMMUNITY
Start: 2020-09-16 | End: 2021-01-01

## 2021-01-01 RX ORDER — SODIUM POLYSTYRENE SULFONATE 4.1 MEQ/G
POWDER, FOR SUSPENSION ORAL; RECTAL DAILY
Qty: 1 | Refills: 11 | Status: ACTIVE | COMMUNITY
Start: 2018-11-19 | End: 1900-01-01

## 2021-01-01 RX ORDER — SODIUM CHLORIDE 9 MG/ML
1000 INJECTION INTRAMUSCULAR; INTRAVENOUS; SUBCUTANEOUS
Refills: 0 | Status: DISCONTINUED | OUTPATIENT
Start: 2021-01-01 | End: 2021-01-01

## 2021-01-01 RX ORDER — ROSUVASTATIN CALCIUM 20 MG/1
20 TABLET, FILM COATED ORAL DAILY
Qty: 90 | Refills: 2 | Status: ACTIVE | COMMUNITY
Start: 2021-01-01 | End: 1900-01-01

## 2021-01-01 RX ORDER — LOSARTAN POTASSIUM 100 MG/1
1 TABLET, FILM COATED ORAL
Qty: 0 | Refills: 0 | DISCHARGE

## 2021-01-01 RX ORDER — TORSEMIDE 20 MG/1
20 TABLET ORAL
Qty: 90 | Refills: 3 | Status: ACTIVE | COMMUNITY
Start: 2021-01-01 | End: 1900-01-01

## 2021-01-01 RX ORDER — ATENOLOL 25 MG/1
1 TABLET ORAL
Qty: 0 | Refills: 0 | DISCHARGE

## 2021-01-01 RX ORDER — SODIUM BICARBONATE 650 MG/1
650 TABLET ORAL 3 TIMES DAILY
Qty: 270 | Refills: 3 | Status: ACTIVE | COMMUNITY
Start: 2018-05-24 | End: 1900-01-01

## 2021-01-01 RX ORDER — ALLOPURINOL 300 MG
1 TABLET ORAL
Qty: 0 | Refills: 0 | DISCHARGE

## 2021-01-01 RX ADMIN — SODIUM CHLORIDE 75 MILLILITER(S): 9 INJECTION INTRAMUSCULAR; INTRAVENOUS; SUBCUTANEOUS at 14:41

## 2021-01-02 NOTE — PLAN
[FreeTextEntry1] : Pt is working on DM again more actively, minding diet and keeping FS controlled.  He prefers to check bloods at Nephrology and will be seeing Dr. Velasco in about a week.  Nephrolithiasis has been stable overall and he has followed up with Dr. Browning - he has had no pain from this.  Will need to see Cardiology for evaluation given abnormal ekg at baseline, favian with possible THR on the horizon.  Consultation with Orthopedics is essential - he is very limited by his hip pain and his overall function is greatly impaired, damaging his metabolic capacity as well.

## 2021-01-02 NOTE — PHYSICAL EXAM
[No Acute Distress] : no acute distress [Well Nourished] : well nourished [Well Developed] : well developed [Well-Appearing] : well-appearing [Normal Sclera/Conjunctiva] : normal sclera/conjunctiva [PERRL] : pupils equal round and reactive to light [EOMI] : extraocular movements intact [Normal Outer Ear/Nose] : the outer ears and nose were normal in appearance [Normal Oropharynx] : the oropharynx was normal [No JVD] : no jugular venous distention [No Lymphadenopathy] : no lymphadenopathy [Supple] : supple [Thyroid Normal, No Nodules] : the thyroid was normal and there were no nodules present [No Respiratory Distress] : no respiratory distress  [No Accessory Muscle Use] : no accessory muscle use [Clear to Auscultation] : lungs were clear to auscultation bilaterally [Normal Rate] : normal rate  [Regular Rhythm] : with a regular rhythm [Normal S1, S2] : normal S1 and S2 [No Murmur] : no murmur heard [No Carotid Bruits] : no carotid bruits [No Abdominal Bruit] : a ~M bruit was not heard ~T in the abdomen [No Varicosities] : no varicosities [Pedal Pulses Present] : the pedal pulses are present [No Edema] : there was no peripheral edema [No Palpable Aorta] : no palpable aorta [No Extremity Clubbing/Cyanosis] : no extremity clubbing/cyanosis [Soft] : abdomen soft [Non Tender] : non-tender [Non-distended] : non-distended [No Masses] : no abdominal mass palpated [No HSM] : no HSM [Normal Bowel Sounds] : normal bowel sounds [Declined Rectal Exam] : declined rectal exam [Normal Posterior Cervical Nodes] : no posterior cervical lymphadenopathy [Normal Anterior Cervical Nodes] : no anterior cervical lymphadenopathy [No CVA Tenderness] : no CVA  tenderness [No Spinal Tenderness] : no spinal tenderness [No Joint Swelling] : no joint swelling [Grossly Normal Strength/Tone] : grossly normal strength/tone [No Rash] : no rash [Coordination Grossly Intact] : coordination grossly intact [No Focal Deficits] : no focal deficits [Deep Tendon Reflexes (DTR)] : deep tendon reflexes were 2+ and symmetric [Normal Affect] : the affect was normal [Normal Insight/Judgement] : insight and judgment were intact [Comprehensive Foot Exam Normal] : Right and left foot were examined and both feet are normal. No ulcers in either foot. Toes are normal and with full ROM.  Normal tactile sensation with monofilament testing throughout both feet [de-identified] : overweight [de-identified] : tenderness r hip, rom limited [de-identified] : antalgic gait

## 2021-01-02 NOTE — HISTORY OF PRESENT ILLNESS
[FreeTextEntry1] : follow up [de-identified] : Pt has continued to be notably hobbled by his R hip advanced DJD.  He is beginning to think strongly about seeing orthopedics about pursuing THR.  He is also showing willingness to see cardiology for evaluation of his abnormal ekg; an important issue for his general health that also must be investigated further prior to any major surgery.  He has been minding his diet again in preparation for these steps, aiming to lose some weight.  He relates fairly normal AM glucoses recently on home FS -- <130.

## 2021-01-28 PROBLEM — R60.0 LOWER EXTREMITY EDEMA: Status: ACTIVE | Noted: 2021-01-01

## 2021-03-29 NOTE — DISCUSSION/SUMMARY
[FreeTextEntry1] : Patient is a 70 year-old gentleman with multiple cardiovascular risk factors including morbid obesity, type II diabetes, and CKD, presenting now for cardiovascular evaluation.\par Echocardiogram to evaluate for structural heart disease.\par Nuclear stress test (pharmacologic as patient cannot exercise due to hip pain) to evaluate for ischemic heart disease in patient who is planning to have hip surgery. \par \par Strongly encouraged diet and exercise. Specifically, lose the bread on all currently sandwiches while otherwise keeping the meal the same. Walking as tolerated.

## 2021-03-29 NOTE — PHYSICAL EXAM
[General Appearance - Well Developed] : well developed [Normal Appearance] : normal appearance [Well Groomed] : well groomed [General Appearance - Well Nourished] : well nourished [No Deformities] : no deformities [General Appearance - In No Acute Distress] : no acute distress [Conjunctiva] : the conjunctiva were normal in both eyes [Normal] : the eyelids were normal bilaterally [PERRL] : pupils were equal in size, round, and reactive to light [EOM Intact] : extraocular movements were intact [Heart Rate And Rhythm] : heart rate and rhythm were normal [Heart Sounds] : normal S1 and S2 [Murmurs] : no murmurs present [Arterial Pulses Normal] : the arterial pulses were normal [] : no respiratory distress [Respiration, Rhythm And Depth] : normal respiratory rhythm and effort [Exaggerated Use Of Accessory Muscles For Inspiration] : no accessory muscle use [Auscultation Breath Sounds / Voice Sounds] : lungs were clear to auscultation bilaterally [Bowel Sounds] : normal bowel sounds [Abdomen Soft] : soft [Nail Clubbing] : no clubbing of the fingernails [Cyanosis, Localized] : no localized cyanosis [Skin Color & Pigmentation] : normal skin color and pigmentation [No Xanthoma] : no  xanthoma was observed [Oriented To Time, Place, And Person] : oriented to person, place, and time [Impaired Insight] : insight and judgment were intact [Affect] : the affect was normal [Mood] : the mood was normal [No Anxiety] : not feeling anxious [Yellow Sclera (Icteric)] : no scleral icterus was seen [FreeTextEntry1] : chronic venous stasis changes

## 2021-04-17 NOTE — PLAN
[FreeTextEntry1] : Pt with difficult set of comorbidities including morbid obesity, htn, dm with neuropathy, ckd advanced, and severe hip arthritis.  For years he has resisted undergoing cardiac workup for his abnormal ekg, but he is now recommitted to getting hip joint dz addressed, and in preparation for that is going for full cardiac evaluation with Dr. Oneill.  Studies pending.  Have urged pt to follow up with Dr. Velasco as well as remain adherent to all meds as prescribed.  He will continue to be vigilant with diet to keep blood glucoses optimized and to get weight moving in right direction.  Close follow-up and further planning needed.  Will try to arrange covid vax for patient as well.

## 2021-04-17 NOTE — HISTORY OF PRESENT ILLNESS
[FreeTextEntry1] : hip pain [de-identified] : Pt has continued to be limited in his activities by his severe hip pain.  He is recommitted however to getting something definitive done for this. he plans to have hip replacement if at all possible.  we had discussed many x previously that must be sure about coronary status and must keep renal status optimized in heading towards that goal.  he unfortunately missed last renal appointment but will reschedule.  he has gone to see Dr. Oneill for cardiac assessment and he will be heading for echo and stress soon.  He has been taking diet seriously and his most recent AM glucoses are 111, 88, 93, and 86.  He has been taking his NaHCO3 two instead of three times per day but otherwise has been adherent with meds.  weight is down a bit and he has not been experiencing swelling.

## 2021-04-17 NOTE — PHYSICAL EXAM
[Well Nourished] : well nourished [Well Developed] : well developed [Well-Appearing] : well-appearing [Normal Sclera/Conjunctiva] : normal sclera/conjunctiva [PERRL] : pupils equal round and reactive to light [EOMI] : extraocular movements intact [Normal Outer Ear/Nose] : the outer ears and nose were normal in appearance [Normal Oropharynx] : the oropharynx was normal [No JVD] : no jugular venous distention [No Lymphadenopathy] : no lymphadenopathy [Supple] : supple [Thyroid Normal, No Nodules] : the thyroid was normal and there were no nodules present [No Respiratory Distress] : no respiratory distress  [No Accessory Muscle Use] : no accessory muscle use [Clear to Auscultation] : lungs were clear to auscultation bilaterally [Normal Rate] : normal rate  [Regular Rhythm] : with a regular rhythm [Normal S1, S2] : normal S1 and S2 [No Murmur] : no murmur heard [No Carotid Bruits] : no carotid bruits [No Abdominal Bruit] : a ~M bruit was not heard ~T in the abdomen [No Varicosities] : no varicosities [Pedal Pulses Present] : the pedal pulses are present [No Edema] : there was no peripheral edema [No Palpable Aorta] : no palpable aorta [Soft] : abdomen soft [No Extremity Clubbing/Cyanosis] : no extremity clubbing/cyanosis [Non Tender] : non-tender [Non-distended] : non-distended [No Masses] : no abdominal mass palpated [No HSM] : no HSM [Normal Bowel Sounds] : normal bowel sounds [Declined Rectal Exam] : declined rectal exam [Normal Posterior Cervical Nodes] : no posterior cervical lymphadenopathy [Normal Anterior Cervical Nodes] : no anterior cervical lymphadenopathy [No CVA Tenderness] : no CVA  tenderness [No Spinal Tenderness] : no spinal tenderness [No Joint Swelling] : no joint swelling [Grossly Normal Strength/Tone] : grossly normal strength/tone [No Rash] : no rash [Coordination Grossly Intact] : coordination grossly intact [No Focal Deficits] : no focal deficits [Deep Tendon Reflexes (DTR)] : deep tendon reflexes were 2+ and symmetric [Normal Affect] : the affect was normal [Normal Insight/Judgement] : insight and judgment were intact [Comprehensive Foot Exam Normal] : Right and left foot were examined and both feet are normal. No ulcers in either foot. Toes are normal and with full ROM.  Normal tactile sensation with monofilament testing throughout both feet [de-identified] : overweight [de-identified] : tenderness r hip, rom limited [de-identified] : antalgic gait

## 2021-05-04 NOTE — DISCUSSION/SUMMARY
[de-identified] : This patient has right hip osteoarthritis.  The patient is not an appropriate candidate for surgical intervention at this time. An extensive discussion was conducted on the natural history of the disease and the variety of surgical and non-surgical options available to the patient including, but not limited to non-steroidal anti-inflammatory medications, steroid injections, physical therapy, maintenance of ideal body weight, and reduction of activity.  He will need to bring his BMI below 35 to be a candidate for total hip arthroplasty as he is morbidly obese and he is at very high risk for a complication such as a periprosthetic joint infection, periprosthetic fracture, nerve injury.  He is also severely decompensated.  Recommended consideration for bariatric surgery which she is states that he has never considered.  We will continue with his cardiac work-up and renal work-up.  At this time he wants to try to lose weight on his own with diet.\par The patient will schedule an appointment as needed.\par \par The patient has been counseled regarding the elevated risks associated with surgical complications in patients with a BMI>35.  I explained to the patient the risk of obesity, which is well documented in the orthopedic literature as increasing risks of wound breakdown (dehiscence), drainage, periprosthetic joint infection, dissatisfaction, chronic pain, early failure and/or loosening of the prosthesis, and impaired overall outcome to the patient. The patient demonstrates a profound understanding of the increased risk. Nutritionist referral, methods of monitoring nutrition intake and calorie counting and bariatric surgery options were discussed with the patient. After a lengthy discussion, the patient agreed to make a coordinated effort at weight loss. The patient understands our BMI policy and if they are planning surgical intervention, then they will need to bring their BMI below 35 in order to proceed and they are agreeable to this.\par

## 2021-05-04 NOTE — PHYSICAL EXAM
[de-identified] : Patient is well nourished, well-developed, in no acute distress, with appropriate mood and affect. The patient is oriented to time, place, and person. Respirations are even and unlabored. Gait evaluation reveals a limp. There is no inguinal adenopathy. Examination of the contralateral hip shows normal range of motion, strength, no tenderness, and intact skin. The affected limb is well-perfused, shows a grossly normal motor and sensory examination. Examination of the hip shows no skin lesions. Hip motion is reduced and causes pain. FADIR is positive and CAMACHO is positive. Stinchfield test is positive. Leg lengths are approximately equal. Both hips are stable and muscle strength is normal. Pedal pulses are palpable. [de-identified] : AP and lateral x-rays of the right hip, pelvis, and femur were ordered and taken in the office and demonstrate degenerative joint disease of the hip with joint space narrowing, osteophyte formation, and subchondral sclerosis.\par

## 2021-05-04 NOTE — HISTORY OF PRESENT ILLNESS
[de-identified] : This is very nice 70-year-old gentleman experiencing chronic right hip and groin and thigh pain, which is severe in intensity. The pain substantially limits activities of daily living. Walking tolerance is reduced.  He is ambulating with a cane, walker or wheelchair.  He cannot take NSAIDs because of heart disease and kidney disease.  He does take Tylenol which does help.  He is in the middle of obtaining a cardiac work-up.  He is also morbidly obese with BMI 48.  He has made no attempts at weight loss.

## 2021-05-04 NOTE — CONSULT LETTER
[Dear  ___] : Dear ~STEPH, [Courtesy Letter:] : I had the pleasure of seeing your patient, [unfilled], in my office today. [Please see my note below.] : Please see my note below. [Consult Closing:] : Thank you very much for allowing me to participate in the care of this patient.  If you have any questions, please do not hesitate to contact me. [Sincerely,] : Sincerely, [FreeTextEntry3] : John Enriquez M.D.\par Adult Joint Reconstruction Orthopedic Surgeon\par Department of Orthopaedic Surgery\par [facility]

## 2021-05-14 PROBLEM — Z01.818 PREOP TESTING: Status: ACTIVE | Noted: 2021-01-01

## 2021-07-02 NOTE — ASU DISCHARGE PLAN (ADULT/PEDIATRIC) - CARE PROVIDER_API CALL
Demetrius Oneill)  Cardiology; Internal Medicine  [address], [address]  [place] [address]  Phone: [phone]  Fax: [phone]  Established Patient  Follow Up Time: 2 weeks

## 2021-07-02 NOTE — H&P CARDIOLOGY - HISTORY OF PRESENT ILLNESS
70 year old obese male (no implantable devices) with a PMHx of HTN, HLD, DMT2 (managed by PMD, last A1c unknown), CKD, and chronic hip pain. Pt was initially seen by his cardiologist prior to hip surgery for clearance where he had an abnormal nuclear stress test (results not in chart). He presents today for cardiac cath to further evaluate for ischemia. He currently denies any other complaints.

## 2021-07-02 NOTE — ASU DISCHARGE PLAN (ADULT/PEDIATRIC) - ASU DC SPECIAL INSTRUCTIONSFT
s/p Left Heart Catheterization with mild diffuse disease to LAD and RCA via right radial artery.    Wound Care:   the day AFTER your procedure remove bandage GENTLY, and clean using  mild soap and gentle warm, water stream, pat dry. leave OPEN to air. YOU MAY SHOWER   DO NOT apply lotions, creams, ointments, powder, perfumes to your incision site  DO NOT SOAK your site for 1 week ( no baths, no pools, no tubs, etc...)  Check  your groin and /or wrist daily. A small amount of bruising, and soreness are normal    ACTIVITY: for 24 hours   - DO NOT DRIVE  - DO NOT make any important decisions or sign legal documents   - DO NOT operate heavy machineries   - you may resume sexual activity in 48 hours, unless otherwise instructed by your cardiologist     If your procedure was done through the WRIST: for the NEXT 3DAYS:  - avoid pushing, pulling, with that affected wrist   - avoid repeated movement of that hand and wrist ( eg: typing, hammering)  - DO NOT LIFT anything more than 5 lbs       MEDICATION:   take your medications as explained ( see discharge paperwork)   If you received a STENT, you will be taking antiplatelet medications to KEEP YOUR STENT OPEN ( eg: Aspirin, Plavix, Brilinta, Effient, etc).  Take as prescribed DO NOT STOP taking them without consulting with your cardiologist first.     Follow heart healthy diet recommended by your doctor, if you smoke STOP SMOKING ( may call [phone] for center of tobacco control if you need assistance)     CALL your doctor to make appointment in 2 WEEKS     ***CALL YOUR DOCTOR***  if you experience: fever, chills, body aches, or severe pain, swelling, redness, heat or yellow discharge at incision site  If you experience bleeding or excruciating pain at the procedural site, swelling ( golf ball size) at your procedural site  If you experience CHEST PAIN  If you experience extremity numbness, tingling, temperature change ( of your procedural site)   If you are unable to reach your doctor, you may contact:   -Cardiology Office at [facility] at [phone] or   - [facility] [phone]

## 2021-07-02 NOTE — H&P CARDIOLOGY - PMH
Calculus of Kidney  bilateral  Diabetes Mellitus, Type 2  diet control  HTN    Obesity    Other Peripheral Vascular Dise  phelbitis right leg

## 2021-07-21 PROBLEM — B35.4 TINEA CORPORIS: Status: ACTIVE | Noted: 2021-01-01

## 2021-07-28 PROBLEM — R94.39 ABNORMAL NUCLEAR STRESS TEST: Status: ACTIVE | Noted: 2021-01-01

## 2021-07-28 PROBLEM — R94.31 ABNORMAL ECG: Status: ACTIVE | Noted: 2021-01-01

## 2021-07-28 NOTE — HISTORY OF PRESENT ILLNESS
[FreeTextEntry1] : Patient is a 70 year-old gentleman with known cardiovascular risk factors of hypertension, dyslipidemia, noninsulin dependent type II diabetes complicated by neuropathy and nephropathy, morbid obesity, CKD, who has chronic right hip pain for which he is hoping to have hip surgery.\par Currently presents in a wheelchair, but he can ambulate with a cane.\par Patient does not exercise.\par He has been sleeping in a recliner in the living room because he finds it uncomfortable to lay down in bed (hip pain, not orthopnea, by his report).\par \par He has no chest pain or shortness of breath.\par No prior ischemic evaluation.\par \par PMD: Shemar Mcdermott MD [phone]\par Nephrologist: Sandy Rangel MD [phone]\par Urologist: Dayday Browning MD [phone]

## 2021-07-28 NOTE — PHYSICAL EXAM
[General Appearance - Well Developed] : well developed [Normal Appearance] : normal appearance [Well Groomed] : well groomed [General Appearance - Well Nourished] : well nourished [No Deformities] : no deformities [General Appearance - In No Acute Distress] : no acute distress [Conjunctiva] : the conjunctiva were normal in both eyes [Normal] : the eyelids were normal bilaterally [PERRL] : pupils were equal in size, round, and reactive to light [EOM Intact] : extraocular movements were intact [Yellow Sclera (Icteric)] : no scleral icterus was seen [Heart Rate And Rhythm] : heart rate and rhythm were normal [Heart Sounds] : normal S1 and S2 [Murmurs] : no murmurs present [Arterial Pulses Normal] : the arterial pulses were normal [] : no respiratory distress [Respiration, Rhythm And Depth] : normal respiratory rhythm and effort [Exaggerated Use Of Accessory Muscles For Inspiration] : no accessory muscle use [Auscultation Breath Sounds / Voice Sounds] : lungs were clear to auscultation bilaterally [Bowel Sounds] : normal bowel sounds [Abdomen Soft] : soft [Nail Clubbing] : no clubbing of the fingernails [Cyanosis, Localized] : no localized cyanosis [Skin Color & Pigmentation] : normal skin color and pigmentation [No Xanthoma] : no  xanthoma was observed [FreeTextEntry1] : chronic venous stasis changes [Oriented To Time, Place, And Person] : oriented to person, place, and time [Impaired Insight] : insight and judgment were intact [Affect] : the affect was normal [Mood] : the mood was normal [No Anxiety] : not feeling anxious

## 2021-07-28 NOTE — CARDIOLOGY SUMMARY
[de-identified] : 3/29/2021, sinus 80 bpm, first degree AV delay, Caprice 312 msec, RBBB, LAFB [de-identified] : 4/26/2021, pharmacologic nuclear stress test showing defects in the distal anteroseptal, inferolateral, lateral walls that are predominantly reversible, LVEF 68%, LVEDV 104 mL [de-identified] : 4/26/2021, moderate aortic stenosis, peak gradient 36 mmHg, mean gradient 22 mmHg, LVEF 60-65% [de-identified] : 7/2/2021 with Payal Kwon MD at [facility] - mild nonobstructive coronary artery disease

## 2021-07-28 NOTE — DISCUSSION/SUMMARY
[FreeTextEntry1] : Uptitrate statin therapy from simvastatin 10 mg daily to rosuvastatin 20 mg daily for more aggressive LDL control.

## 2021-07-28 NOTE — REASON FOR VISIT
[Arrhythmia/ECG Abnorrmalities] : arrhythmia/ECG abnormalities [FreeTextEntry1] : July 2021 - Patient returns today for follow-up.\par He has lost weight by dieting (eliminating as many carbs as he can).\par He had an abnormal nuclear stress test, but cath showed only mild, nonobstructive disease.

## 2021-07-29 NOTE — PHYSICAL EXAM
[General Appearance - Alert] : alert [General Appearance - In No Acute Distress] : in no acute distress [General Appearance - Well Developed] : well developed [Neck Appearance] : the appearance of the neck was normal [Jugular Venous Distention Increased] : there was no jugular-venous distention [Thyroid Diffuse Enlargement] : the thyroid was not enlarged [Auscultation Breath Sounds / Voice Sounds] : lungs were clear to auscultation bilaterally [Heart Rate And Rhythm] : heart rate was normal and rhythm regular [Heart Sounds] : normal S1 and S2 [Heart Sounds Gallop] : no gallops [Edema] : there was no peripheral edema [Bowel Sounds] : normal bowel sounds [Abdomen Soft] : soft [Abdomen Tenderness] : non-tender [Abnormal Walk] : normal gait [Musculoskeletal - Swelling] : no joint swelling seen [FreeTextEntry1] : using cane  [] : no rash [No Focal Deficits] : no focal deficits [Oriented To Time, Place, And Person] : oriented to person, place, and time

## 2021-07-29 NOTE — HISTORY OF PRESENT ILLNESS
[FreeTextEntry1] : his hip pain is worse. he saw Ortho who recommended weight loss to minimize post op risks. \par he lost 20 pounds with restricting carbohydrates. \par labs were done on 7/8 and reviewed with the patient. cr improved to 3.\par no edema \par K was ok as well. \par he was interested in taking pain relief factor( i looked it up and recommended against as there is a lot of herbal stuff that i don’t know how it would affect his kidneys)

## 2021-07-29 NOTE — ASSESSMENT
[FreeTextEntry1] : \par 70 y.o M with PMHx mentioned above, following for CKD stage 4 with proteinuria, secondary to HTN and DM and obesity( sec FSGS) and kidney stones \par \par CKD stage 4. \par -Creatinine elevated 3 range now. eGFR 20. \par -continue with low salt diet \par - proteinuria has been high despite ARB... limited by hyperkalemia \par -On Bicarbonate tablets due to metabolic acidosis\par \par \par Hyperkalemia:\par - Off Lokelma . back on K exylate \par - Continue to monitor K, \par \par Hypertension:\par -acceptable, continue current regimen. \par \par Mineral Bone disorder. \par - PTH mild elevation as expected in late CKD stage ; 170 and Vitamin D. \par  A1c 5.9 %. repeat pending \par \par \par - will enroll for HT and start preparing for dialysis ( his BMI 47.35 and is not a  transplant candidate) \par RTC in 3-4 months. \par  \par

## 2021-07-29 NOTE — REVIEW OF SYSTEMS
[Fever] : no fever [Chills] : no chills [Feeling Poorly] : not feeling poorly [Feeling Tired] : not feeling tired [Recent Weight Loss (___ Lbs)] : recent [unfilled] ~Ulb weight loss [Eyesight Problems] : no eyesight problems [Loss Of Hearing] : no hearing loss [Sore Throat] : no sore throat [Chest Pain] : no chest pain [Palpitations] : no palpitations [Leg Claudication] : no intermittent leg claudication [Lower Ext Edema] : no extremity edema [Shortness Of Breath] : no shortness of breath [Cough] : no cough [SOB on Exertion] : no shortness of breath during exertion [Orthopnea] : no orthopnea [Abdominal Pain] : no abdominal pain [Vomiting] : no vomiting [Diarrhea] : no diarrhea [Dysuria] : no dysuria [Hesitancy] : no urinary hesitancy [Joint Swelling] : no joint swelling [Limb Swelling] : no limb swelling [Dizziness] : no dizziness [Fainting] : no fainting [Limb Weakness] : no limb weakness [Difficulty Walking] : difficulty walking [Negative] : Heme/Lymph [FreeTextEntry9] :  Hip pain.  [de-identified] : Due to hip pain.

## 2021-08-15 PROBLEM — M16.11 ARTHRITIS OF RIGHT HIP: Status: ACTIVE | Noted: 2021-01-01

## 2021-08-15 NOTE — PHYSICAL EXAM
[Well Nourished] : well nourished [Well-Appearing] : well-appearing [Well Developed] : well developed [Normal Sclera/Conjunctiva] : normal sclera/conjunctiva [PERRL] : pupils equal round and reactive to light [EOMI] : extraocular movements intact [Normal Outer Ear/Nose] : the outer ears and nose were normal in appearance [Normal Oropharynx] : the oropharynx was normal [No JVD] : no jugular venous distention [No Lymphadenopathy] : no lymphadenopathy [Supple] : supple [Thyroid Normal, No Nodules] : the thyroid was normal and there were no nodules present [No Respiratory Distress] : no respiratory distress  [No Accessory Muscle Use] : no accessory muscle use [Clear to Auscultation] : lungs were clear to auscultation bilaterally [Normal Rate] : normal rate  [Regular Rhythm] : with a regular rhythm [Normal S1, S2] : normal S1 and S2 [No Murmur] : no murmur heard [No Carotid Bruits] : no carotid bruits [No Abdominal Bruit] : a ~M bruit was not heard ~T in the abdomen [No Varicosities] : no varicosities [Pedal Pulses Present] : the pedal pulses are present [No Edema] : there was no peripheral edema [No Palpable Aorta] : no palpable aorta [No Extremity Clubbing/Cyanosis] : no extremity clubbing/cyanosis [Non Tender] : non-tender [Soft] : abdomen soft [Non-distended] : non-distended [No Masses] : no abdominal mass palpated [No HSM] : no HSM [Normal Bowel Sounds] : normal bowel sounds [Declined Rectal Exam] : declined rectal exam [Normal Posterior Cervical Nodes] : no posterior cervical lymphadenopathy [Normal Anterior Cervical Nodes] : no anterior cervical lymphadenopathy [No CVA Tenderness] : no CVA  tenderness [No Spinal Tenderness] : no spinal tenderness [No Joint Swelling] : no joint swelling [Grossly Normal Strength/Tone] : grossly normal strength/tone [No Rash] : no rash [Coordination Grossly Intact] : coordination grossly intact [No Focal Deficits] : no focal deficits [Deep Tendon Reflexes (DTR)] : deep tendon reflexes were 2+ and symmetric [Normal Affect] : the affect was normal [Comprehensive Foot Exam Normal] : Right and left foot were examined and both feet are normal. No ulcers in either foot. Toes are normal and with full ROM.  Normal tactile sensation with monofilament testing throughout both feet [Normal Insight/Judgement] : insight and judgment were intact [de-identified] : has visible lost some weight though remains obese [de-identified] : declines exam - see hpi [de-identified] : tenderness r hip, rom limited [de-identified] : antalgic gait

## 2021-08-15 NOTE — PLAN
[FreeTextEntry1] : Pt has become motivated to try to get his hip operated upon in the coming months.  he is working hard at portion reduction, has reduced carb intake still further.  he is getting weight down, hoping to prove to orthopedics that he can be a reasonable surgical candidate.  weight today is below 300# for the first time in years.  DM control has been reasonable despite weight and has historically responded quite well to weight loss - fs seem to agree with this recently.  gravity of reducing weight re-emphasized with pt. if able to get hip replacement, mobility should then be able to markedly increase.  likely tinea at groin though he declines exam -- to use clotrimazole bid x 2 weeks.  must have examination then if does not improve.  desonide for mild eczema episodic elsewhere.  to see Dr. Velasco for ckd shortly.  he prefers to have all blood tests done at Dr. Velasco's office.

## 2021-08-15 NOTE — HISTORY OF PRESENT ILLNESS
[FreeTextEntry1] : hip pain, obesity [de-identified] : Pt has become motivated to try to get his hip operated upon in the coming months.  he is working hard at portion reduction, has reduced carb intake still further.  he is getting weight down, hoping to prove to orthopedics that he can be a reasonable surgical candidate.  weight today is below 300# for the first time in years.  He has been through cardiac testing including catheterization and there was only mild disease seen. no intervention required.  completed covid vax series in May.  checking fs regularly - no more than 110 in AM.  he has noted rash at perineal area and near rectum - pruritic.  declines to allow me to examine this today.  he has been using neosporin but this is only modestly helpful.  continues to tolerate ongoing hip pain with t+c, gabapentin, but notes mobility is a little bit improved since weight loss.

## 2021-10-07 PROBLEM — I44.2 COMPLETE HEART BLOCK: Status: ACTIVE | Noted: 2021-01-01

## 2021-10-13 ENCOUNTER — NON-APPOINTMENT (OUTPATIENT)
Age: 71
End: 2021-10-13

## 2021-11-22 ENCOUNTER — APPOINTMENT (OUTPATIENT)
Dept: UROLOGY | Facility: CLINIC | Age: 71
End: 2021-11-22

## 2021-11-23 ENCOUNTER — APPOINTMENT (OUTPATIENT)
Dept: INTERNAL MEDICINE | Facility: CLINIC | Age: 71
End: 2021-11-23

## 2021-12-01 ENCOUNTER — APPOINTMENT (OUTPATIENT)
Dept: NEPHROLOGY | Facility: CLINIC | Age: 71
End: 2021-12-01

## 2021-12-07 ENCOUNTER — APPOINTMENT (OUTPATIENT)
Dept: CARDIOLOGY | Facility: CLINIC | Age: 71
End: 2021-12-07